# Patient Record
Sex: FEMALE | Race: WHITE
[De-identification: names, ages, dates, MRNs, and addresses within clinical notes are randomized per-mention and may not be internally consistent; named-entity substitution may affect disease eponyms.]

---

## 2017-03-27 ENCOUNTER — HOSPITAL ENCOUNTER (OUTPATIENT)
Dept: HOSPITAL 18 - NAVSJIPCSP | Age: 67
Discharge: HOME | End: 2017-03-27
Attending: INTERNAL MEDICINE
Payer: MEDICARE

## 2017-03-27 DIAGNOSIS — I11.9: ICD-10-CM

## 2017-03-27 DIAGNOSIS — J44.9: ICD-10-CM

## 2017-03-27 DIAGNOSIS — M48.06: ICD-10-CM

## 2017-03-27 DIAGNOSIS — E78.5: Primary | ICD-10-CM

## 2017-03-27 LAB
ANION GAP SERPL CALC-SCNC: 17 MMOL/L (ref 10–20)
BUN SERPL-MCNC: 7 MG/DL (ref 9.8–20.1)
CALCIUM SERPL-MCNC: 8.5 MG/DL (ref 7.8–10.44)
CHD RISK SERPL-RTO: 3.3 (ref ?–4.5)
CHLORIDE SERPL-SCNC: 100 MMOL/L (ref 98–107)
CHOLEST SERPL-MCNC: 174 MG/DL
CO2 SERPL-SCNC: 28 MMOL/L (ref 23–31)
CREAT CL PREDICTED SERPL C-G-VRATE: 0 ML/MIN (ref 70–130)
GLUCOSE SERPL-MCNC: 96 MG/DL (ref 80–115)
HDLC SERPL-MCNC: 52 MG/DL
LDLC SERPL CALC-MCNC: 78 MG/DL
POTASSIUM SERPL-SCNC: 4.6 MMOL/L (ref 3.5–5.1)
SODIUM SERPL-SCNC: 140 MMOL/L (ref 136–145)
TRIGL SERPL-MCNC: 218 MG/DL (ref ?–150)

## 2017-03-27 PROCEDURE — 80061 LIPID PANEL: CPT

## 2017-03-27 PROCEDURE — 80048 BASIC METABOLIC PNL TOTAL CA: CPT

## 2017-03-27 PROCEDURE — 36415 COLL VENOUS BLD VENIPUNCTURE: CPT

## 2017-07-07 ENCOUNTER — HOSPITAL ENCOUNTER (OUTPATIENT)
Dept: HOSPITAL 18 - NAV LAB | Age: 67
Discharge: HOME | End: 2017-07-07
Attending: INTERNAL MEDICINE
Payer: MEDICARE

## 2017-07-07 DIAGNOSIS — E78.5: Primary | ICD-10-CM

## 2017-07-07 LAB
ALBUMIN SERPL BCG-MCNC: 3.8 G/DL (ref 3.4–4.8)
ALP SERPL-CCNC: 96 U/L (ref 40–150)
ALT SERPL W P-5'-P-CCNC: 10 U/L (ref 8–55)
ANION GAP SERPL CALC-SCNC: 13 MMOL/L (ref 10–20)
ANISOCYTOSIS BLD QL SMEAR: (no result) (100X)
AST SERPL-CCNC: 18 U/L (ref 5–34)
BACTERIA UR QL AUTO: (no result) HPF
BASOPHILS # BLD AUTO: 0.2 THOU/UL (ref 0–0.2)
BASOPHILS NFR BLD AUTO: 1.9 % (ref 0–1)
BILIRUB SERPL-MCNC: 0.3 MG/DL (ref 0.2–1.2)
BUN SERPL-MCNC: 6 MG/DL (ref 9.8–20.1)
CALCIUM SERPL-MCNC: 8.7 MG/DL (ref 7.8–10.44)
CHD RISK SERPL-RTO: 3.1 (ref ?–4.5)
CHLORIDE SERPL-SCNC: 101 MMOL/L (ref 98–107)
CHOLEST SERPL-MCNC: 138 MG/DL
CO2 SERPL-SCNC: 31 MMOL/L (ref 23–31)
CREAT CL PREDICTED SERPL C-G-VRATE: 0 ML/MIN (ref 70–130)
EOSINOPHIL # BLD AUTO: 0.4 THOU/UL (ref 0–0.7)
EOSINOPHIL NFR BLD AUTO: 4.8 % (ref 0–10)
GLOBULIN SER CALC-MCNC: 2.6 G/DL (ref 2.4–3.5)
GLUCOSE SERPL-MCNC: 109 MG/DL (ref 80–115)
HDLC SERPL-MCNC: 45 MG/DL
HEP C INDEX: 0.07 S/CO (ref 0–0.79)
HGB BLD-MCNC: 15.5 G/DL (ref 12–16)
LDLC SERPL CALC-MCNC: 66 MG/DL
LYMPHOCYTES # BLD AUTO: 2.7 THOU/UL (ref 1.2–3.4)
LYMPHOCYTES NFR BLD AUTO: 34.2 % (ref 21–51)
MCH RBC QN AUTO: 25.9 PG (ref 27–31)
MCV RBC AUTO: 86.9 FL (ref 81–99)
MDIFF COMPLETE?: YES
MONOCYTES # BLD AUTO: 0.6 THOU/UL (ref 0.11–0.59)
MONOCYTES NFR BLD AUTO: 7.1 % (ref 0–10)
NEUTROPHILS # BLD AUTO: 4.2 THOU/UL (ref 1.4–6.5)
NEUTROPHILS NFR BLD AUTO: 52 % (ref 42–75)
PLATELET # BLD AUTO: 292 THOU/UL (ref 130–400)
PLATELET BLD QL SMEAR: (no result)
POTASSIUM SERPL-SCNC: 4.1 MMOL/L (ref 3.5–5.1)
RBC # BLD AUTO: 5.98 MILL/UL (ref 4.2–5.4)
RBC UR QL AUTO: (no result) HPF (ref 0–3)
SODIUM SERPL-SCNC: 141 MMOL/L (ref 136–145)
SP GR UR STRIP: 1.02 (ref 1–1.03)
T VAGINALIS URNS QL MICRO: (no result) HPF
TRIGL SERPL-MCNC: 136 MG/DL (ref ?–150)
URNS CMNT MICRO: NO
WBC # BLD AUTO: 8 THOU/UL (ref 4.8–10.8)

## 2017-07-07 PROCEDURE — 86803 HEPATITIS C AB TEST: CPT

## 2017-07-07 PROCEDURE — 85025 COMPLETE CBC W/AUTO DIFF WBC: CPT

## 2017-07-07 PROCEDURE — 80053 COMPREHEN METABOLIC PANEL: CPT

## 2017-07-07 PROCEDURE — 81003 URINALYSIS AUTO W/O SCOPE: CPT

## 2017-07-07 PROCEDURE — 36415 COLL VENOUS BLD VENIPUNCTURE: CPT

## 2017-07-07 PROCEDURE — 80061 LIPID PANEL: CPT

## 2017-07-07 PROCEDURE — 81015 MICROSCOPIC EXAM OF URINE: CPT

## 2018-05-01 ENCOUNTER — HOSPITAL ENCOUNTER (OUTPATIENT)
Dept: HOSPITAL 18 - NAV RAD | Age: 68
Discharge: HOME | End: 2018-05-01
Attending: INTERNAL MEDICINE
Payer: MEDICARE

## 2018-05-01 DIAGNOSIS — M79.672: Primary | ICD-10-CM

## 2018-05-01 DIAGNOSIS — M77.32: ICD-10-CM

## 2018-05-01 NOTE — RAD
LEFT HEEL TWO VIEWS:

 

History: Heel pain. 

 

Comparison: 8-13-14

 

FINDINGS: 

There is a calcaneal spur at the insertion of plantar fascia on the calcaneus. Some sclerotic change 
in the more posterior aspect of the calcaneus. This is a stable and benign appearing finding. Subtala
r joint is unremarkable. There are some arthritic changes of the talonavicular joint. 

 

IMPRESSION: 

Calcaneal spur. No acute process. 

 

POS: [facility]

## 2020-09-10 ENCOUNTER — HOSPITAL ENCOUNTER (INPATIENT)
Dept: HOSPITAL 92 - ERS | Age: 70
LOS: 5 days | Discharge: HOME | DRG: 273 | End: 2020-09-15
Attending: INTERNAL MEDICINE | Admitting: INTERNAL MEDICINE
Payer: MEDICARE

## 2020-09-10 VITALS — BODY MASS INDEX: 33.2 KG/M2

## 2020-09-10 DIAGNOSIS — J44.1: ICD-10-CM

## 2020-09-10 DIAGNOSIS — E66.2: ICD-10-CM

## 2020-09-10 DIAGNOSIS — J96.21: ICD-10-CM

## 2020-09-10 DIAGNOSIS — I50.33: ICD-10-CM

## 2020-09-10 DIAGNOSIS — Z98.41: ICD-10-CM

## 2020-09-10 DIAGNOSIS — I48.92: Primary | ICD-10-CM

## 2020-09-10 DIAGNOSIS — E78.5: ICD-10-CM

## 2020-09-10 DIAGNOSIS — G92: ICD-10-CM

## 2020-09-10 DIAGNOSIS — Z79.899: ICD-10-CM

## 2020-09-10 DIAGNOSIS — I48.91: ICD-10-CM

## 2020-09-10 DIAGNOSIS — F17.200: ICD-10-CM

## 2020-09-10 DIAGNOSIS — E87.5: ICD-10-CM

## 2020-09-10 DIAGNOSIS — I08.1: ICD-10-CM

## 2020-09-10 DIAGNOSIS — Z79.82: ICD-10-CM

## 2020-09-10 DIAGNOSIS — Z20.828: ICD-10-CM

## 2020-09-10 DIAGNOSIS — Z88.5: ICD-10-CM

## 2020-09-10 DIAGNOSIS — J96.22: ICD-10-CM

## 2020-09-10 DIAGNOSIS — Z88.8: ICD-10-CM

## 2020-09-10 DIAGNOSIS — Z88.0: ICD-10-CM

## 2020-09-10 DIAGNOSIS — E87.1: ICD-10-CM

## 2020-09-10 DIAGNOSIS — I47.1: ICD-10-CM

## 2020-09-10 DIAGNOSIS — J40: ICD-10-CM

## 2020-09-10 LAB
ALBUMIN SERPL BCG-MCNC: 3.7 G/DL (ref 3.4–4.8)
ALP SERPL-CCNC: 93 U/L (ref 40–110)
ALT SERPL W P-5'-P-CCNC: 21 U/L (ref 8–55)
ANALYZER IN CARDIO: (no result)
ANION GAP SERPL CALC-SCNC: 12 MMOL/L (ref 10–20)
ANION GAP SERPL CALC-SCNC: 13 MMOL/L (ref 10–20)
AST SERPL-CCNC: 22 U/L (ref 5–34)
BASE EXCESS STD BLDA CALC-SCNC: 6.5 MEQ/L
BASOPHILS # BLD AUTO: 0.1 THOU/UL (ref 0–0.2)
BASOPHILS NFR BLD AUTO: 1.3 % (ref 0–1)
BILIRUB SERPL-MCNC: 0.6 MG/DL (ref 0.2–1.2)
BUN SERPL-MCNC: 13 MG/DL (ref 9.8–20.1)
BUN SERPL-MCNC: 14 MG/DL (ref 9.8–20.1)
CA-I BLDA-SCNC: 1.16 MMOL/L (ref 1.12–1.3)
CALCIUM SERPL-MCNC: 7.7 MG/DL (ref 7.8–10.44)
CALCIUM SERPL-MCNC: 8.5 MG/DL (ref 7.8–10.44)
CHLORIDE SERPL-SCNC: 87 MMOL/L (ref 98–107)
CHLORIDE SERPL-SCNC: 89 MMOL/L (ref 98–107)
CK MB SERPL-MCNC: 4.2 NG/ML (ref 0–6.6)
CK SERPL-CCNC: 94 U/L (ref 29–168)
CO2 SERPL-SCNC: 29 MMOL/L (ref 23–31)
CO2 SERPL-SCNC: 33 MMOL/L (ref 23–31)
CREAT CL PREDICTED SERPL C-G-VRATE: 0 ML/MIN (ref 70–130)
CREAT CL PREDICTED SERPL C-G-VRATE: 123 ML/MIN (ref 70–130)
EOSINOPHIL # BLD AUTO: 0.1 THOU/UL (ref 0–0.7)
EOSINOPHIL NFR BLD AUTO: 0.7 % (ref 0–10)
GLOBULIN SER CALC-MCNC: 2.8 G/DL (ref 2.4–3.5)
GLUCOSE SERPL-MCNC: 108 MG/DL (ref 80–115)
GLUCOSE SERPL-MCNC: 127 MG/DL (ref 80–115)
HCO3 BLDA-SCNC: 41 MEQ/L (ref 22–28)
HCT VFR BLDA CALC: 45 % (ref 36–47)
HGB BLD-MCNC: 14.9 G/DL (ref 12–16)
HGB BLDA-MCNC: 15.4 G/DL (ref 12–16)
LIPASE SERPL-CCNC: 23 U/L (ref 8–78)
LYMPHOCYTES # BLD: 1.8 THOU/UL (ref 1.2–3.4)
LYMPHOCYTES NFR BLD AUTO: 24 % (ref 21–51)
MCH RBC QN AUTO: 25.1 PG (ref 27–31)
MCV RBC AUTO: 83.6 FL (ref 78–98)
MDIFF COMPLETE?: YES
MONOCYTES # BLD AUTO: 0.6 THOU/UL (ref 0.11–0.59)
MONOCYTES NFR BLD AUTO: 8.2 % (ref 0–10)
NEUTROPHILS # BLD AUTO: 5 THOU/UL (ref 1.4–6.5)
NEUTROPHILS NFR BLD AUTO: 65.8 % (ref 42–75)
O2 A-A PPRESDIFF RESPIRATORY: 498.18 MM[HG] (ref 0–20)
PCO2 BLDA: 124.5 MMHG (ref 35–45)
PH BLDA: 7.14 [PH] (ref 7.35–7.45)
PLATELET # BLD AUTO: 173 THOU/UL (ref 130–400)
PO2 BLDA: 59.2 MMHG (ref 80–?)
POLYCHROMASIA BLD QL SMEAR: (no result) (100X)
POTASSIUM BLD-SCNC: 4.62 MMOL/L (ref 3.7–5.3)
POTASSIUM SERPL-SCNC: 5.1 MMOL/L (ref 3.5–5.1)
POTASSIUM SERPL-SCNC: 5.5 MMOL/L (ref 3.5–5.1)
RBC # BLD AUTO: 5.94 MILL/UL (ref 4.2–5.4)
SODIUM SERPL-SCNC: 125 MMOL/L (ref 136–145)
SODIUM SERPL-SCNC: 127 MMOL/L (ref 136–145)
SPECIMEN DRAWN FROM PATIENT: (no result)
STOMATOCYTES BLD QL SMEAR: (no result) (100X)
TARGETS BLD QL SMEAR: (no result) (100X)
TROPONIN I SERPL DL<=0.01 NG/ML-MCNC: 0.03 NG/ML (ref ?–0.03)
WBC # BLD AUTO: 7.6 THOU/UL (ref 4.8–10.8)

## 2020-09-10 PROCEDURE — 76942 ECHO GUIDE FOR BIOPSY: CPT

## 2020-09-10 PROCEDURE — 5A09357 ASSISTANCE WITH RESPIRATORY VENTILATION, LESS THAN 24 CONSECUTIVE HOURS, CONTINUOUS POSITIVE AIRWAY PRESSURE: ICD-10-PCS | Performed by: INTERNAL MEDICINE

## 2020-09-10 PROCEDURE — 85025 COMPLETE CBC W/AUTO DIFF WBC: CPT

## 2020-09-10 PROCEDURE — 93621 COMP EP EVL L PAC&REC C SINS: CPT

## 2020-09-10 PROCEDURE — 99152 MOD SED SAME PHYS/QHP 5/>YRS: CPT

## 2020-09-10 PROCEDURE — 83690 ASSAY OF LIPASE: CPT

## 2020-09-10 PROCEDURE — 94660 CPAP INITIATION&MGMT: CPT

## 2020-09-10 PROCEDURE — 96367 TX/PROPH/DG ADDL SEQ IV INF: CPT

## 2020-09-10 PROCEDURE — 80053 COMPREHEN METABOLIC PANEL: CPT

## 2020-09-10 PROCEDURE — 71045 X-RAY EXAM CHEST 1 VIEW: CPT

## 2020-09-10 PROCEDURE — 36415 COLL VENOUS BLD VENIPUNCTURE: CPT

## 2020-09-10 PROCEDURE — C1732 CATH, EP, DIAG/ABL, 3D/VECT: HCPCS

## 2020-09-10 PROCEDURE — 93005 ELECTROCARDIOGRAM TRACING: CPT

## 2020-09-10 PROCEDURE — 96365 THER/PROPH/DIAG IV INF INIT: CPT

## 2020-09-10 PROCEDURE — 83880 ASSAY OF NATRIURETIC PEPTIDE: CPT

## 2020-09-10 PROCEDURE — 84443 ASSAY THYROID STIM HORMONE: CPT

## 2020-09-10 PROCEDURE — 87040 BLOOD CULTURE FOR BACTERIA: CPT

## 2020-09-10 PROCEDURE — 82805 BLOOD GASES W/O2 SATURATION: CPT

## 2020-09-10 PROCEDURE — 83930 ASSAY OF BLOOD OSMOLALITY: CPT

## 2020-09-10 PROCEDURE — 93653 COMPRE EP EVAL TX SVT: CPT

## 2020-09-10 PROCEDURE — 83605 ASSAY OF LACTIC ACID: CPT

## 2020-09-10 PROCEDURE — 36416 COLLJ CAPILLARY BLOOD SPEC: CPT

## 2020-09-10 PROCEDURE — 83735 ASSAY OF MAGNESIUM: CPT

## 2020-09-10 PROCEDURE — 96375 TX/PRO/DX INJ NEW DRUG ADDON: CPT

## 2020-09-10 PROCEDURE — 82550 ASSAY OF CK (CPK): CPT

## 2020-09-10 PROCEDURE — 84484 ASSAY OF TROPONIN QUANT: CPT

## 2020-09-10 PROCEDURE — 93306 TTE W/DOPPLER COMPLETE: CPT

## 2020-09-10 PROCEDURE — 96366 THER/PROPH/DIAG IV INF ADDON: CPT

## 2020-09-10 PROCEDURE — 83935 ASSAY OF URINE OSMOLALITY: CPT

## 2020-09-10 PROCEDURE — 94640 AIRWAY INHALATION TREATMENT: CPT

## 2020-09-10 PROCEDURE — 5A2204Z RESTORATION OF CARDIAC RHYTHM, SINGLE: ICD-10-PCS | Performed by: INTERNAL MEDICINE

## 2020-09-10 PROCEDURE — 71275 CT ANGIOGRAPHY CHEST: CPT

## 2020-09-10 PROCEDURE — 93623 PRGRMD STIMJ&PACG IV RX NFS: CPT

## 2020-09-10 PROCEDURE — 82553 CREATINE MB FRACTION: CPT

## 2020-09-10 PROCEDURE — 87149 DNA/RNA DIRECT PROBE: CPT

## 2020-09-10 PROCEDURE — 97139 UNLISTED THERAPEUTIC PX: CPT

## 2020-09-10 PROCEDURE — C1730 CATH, EP, 19 OR FEW ELECT: HCPCS

## 2020-09-10 PROCEDURE — 96372 THER/PROPH/DIAG INJ SC/IM: CPT

## 2020-09-10 PROCEDURE — 80048 BASIC METABOLIC PNL TOTAL CA: CPT

## 2020-09-10 PROCEDURE — 93010 ELECTROCARDIOGRAM REPORT: CPT

## 2020-09-10 PROCEDURE — U0002 COVID-19 LAB TEST NON-CDC: HCPCS

## 2020-09-10 PROCEDURE — 84300 ASSAY OF URINE SODIUM: CPT

## 2020-09-10 PROCEDURE — 93613 INTRACARDIAC EPHYS 3D MAPG: CPT

## 2020-09-10 NOTE — PDOC.HHP
Hospitalist HPI





- History of Present Illness


Chest pain


History of Present Illness: 


Patient is a 70 year old female with PMH diastolic CHF, COPD, asthma who 

presents to ED for chest pain. Patient developed chest pain at home, assocaited 

with SOB, orthopnea, dyspnea on exertion. EMS brought here, on route noted to 

be in atrial flutter w/ RVR, started on cardizem drip 5/hr. Here in ED, rhythm 

noted on arrival to be 170s atrial flutter, patient with low BP and unstable so 

given DCCV and converted to sinus rhythm and then atrial fibrillation w/ rate 70

-80s and BP improved. Patient was hypoxic on NC (patient wears 4L o2 by NC at 

home),  required NRB then BIPAP to maintain sats above 89%. CXR w R pleural 

effusion and overall CXR reported as suspicious for CHF. Last echo in system 6/ 2019, revealed preserved EF and mild/moderate mitral and tricuspid 

regurgitation. Patient reported to ED no known history of afib or aflutter. In 

ED, ABG significant for repsiratory acidosis w/ CO2 124, pH 7.14. Na 127, K 

5.5. COVID screen negative. Pateint to be admitted to IMCU for rescue BIPAP for 

hypoxic/hypercapneic respiratory failure





Hospitalist ROS





- Review of Systems


ROS unobtainable: due to mental status (BIPAP, altered mental status, awake and 

alert but not able to help with ROS significantly since desaturates rapidly if 

bipap removed to talk)


All other systems reviewed; all pertinent +/- noted in HPI/Subj





- Medication


Medications: 





reviewed, see ED and admission documents for list





Hospitalist History





- Past Medical History


Other Medical History: 





diastolic CHF, COPD, asthma





- Past Surgical History


Other Surgical History: 





knee arthroscopy


spinal surgery/laminectomy


tubal ligation





- Family History


Family History: reports: Other


Other Family History: 





sisters x 3 passed from aneurysms.





- Social History


Tobacco Type: cigarettes


Alcohol: reports: Occassional


Drugs: reports: none





- Exam


General Appearance: NAD, awake alert


General - other findings: on BIPAP, awake and alert


Eye: PERRL, anicteric sclera


ENT: normocephalic atraumatic, no oropharyngeal lesions, moist mucosa


Neck: supple, symmetric, no JVD, no thyromegaly, no lymphadenopathy, no carotid 

bruit


Heart: RRR, no murmur, no gallops, no rubs, normal peripheral pulses


Respiratory: CTAB, no rales, no ronchi, normal chest expansion, no tachypnea, 

normal percussion


Respiratory - other findings: crackles bibasilar


Gastrointestinal: soft, non-tender, non-distended, normal bowel sounds, no 

palpable masses, no hepatomegaly, no splenomegaly, no bruit


Extremities: no cyanosis, no clubbing, no edema


Skin: normal turgor, no lesions, no rashes


Neurological: cranial nerve grossly intact, normal sensation to touch, no 

weakness, no focal deficits, no new deficit


Neurological - other findings: exam limited by bipap and immediate desaturation 

on removal of mask


Musculoskeletal: normal tone, normal strength, no muscle wasting


Psychiatric - other findings: unable to evaluate





Hospitalist Results





- Labs


Result Diagrams: 


 09/10/20 18:30





 09/10/20 18:30


Lab results: 


 











WBC  7.6 thou/uL (4.8-10.8)   09/10/20  18:30    


 


Hgb  14.9 g/dL (12.0-16.0)   09/10/20  18:30    


 


Hct  49.7 % (36.0-47.0)  H  09/10/20  18:30    


 


MCV  83.6 fL (78.0-98.0)   09/10/20  18:30    


 


Plt Count  173 thou/uL (130-400)   09/10/20  18:30    


 


Neutrophils %  65.8 % (42.0-75.0)   09/10/20  18:30    


 


ABG pH  7.14  (7.35-7.45)  L*  09/10/20  20:17    


 


ABG pCO2  124.5 mmHg (35.0-45.0)  H*  09/10/20  20:17    


 


ABG pO2  59.2 mmHg (> 80.0)  L*  09/10/20  20:17    


 


Sodium  127 mmol/L (136-145)  L  09/10/20  18:30    


 


Potassium  5.5 mmol/L (3.5-5.1)  H  09/10/20  18:30    


 


Chloride  87 mmol/L ()  L  09/10/20  18:30    


 


Carbon Dioxide  33 mmol/L (23-31)  H  09/10/20  18:30    


 


BUN  14 mg/dL (9.8-20.1)   09/10/20  18:30    


 


Creatinine  0.64 mg/dL (0.6-1.1)   09/10/20  18:30    


 


Glucose  108 mg/dL ()   09/10/20  18:30    


 


Lactic Acid  0.8 mmol/L (0.5-2.2)   09/10/20  19:34    


 


Calcium  8.5 mg/dL (7.8-10.44)   09/10/20  18:30    


 


Total Bilirubin  0.6 mg/dL (0.2-1.2)   09/10/20  18:30    


 


AST  22 U/L (5-34)   09/10/20  18:30    


 


ALT  21 U/L (8-55)   09/10/20  18:30    


 


Alkaline Phosphatase  93 U/L ()   09/10/20  18:30    


 


Creatine Kinase  94 U/L ()   09/10/20  18:30    


 


CK-MB (CK-2)  4.2 ng/mL (0-6.6)   09/10/20  18:30    


 


Troponin I  0.029 ng/mL (< 0.028)  H  09/10/20  21:07    


 


B-Natriuretic Peptide  244.1 pg/mL (0-100)  H  09/10/20  18:30    


 


Serum Total Protein  6.5 g/dL (6.0-8.3)   09/10/20  18:30    


 


Albumin  3.7 g/dL (3.4-4.8)   09/10/20  18:30    


 


Lipase  23 U/L (8-78)   09/10/20  18:30    











Additional comment: 





BP: 137/78, MAP: 97, Pulse: 90, Resp: 19, Pain: 0, O2 sat: 91 on (Non Rebreather

), Time: 9/10/2020 20:17. 


VITAL SIGNS Thu Sep 10, 2020 20:23 BRONSON Serrato Sarah 


BP: 116/62, MAP: 80, Pulse: 86, Resp: 20, Pain: 0, O2 sat: 92 on (Non Rebreather

), Time: 9/10/2020 20:23. 


VITAL SIGNS Thu Sep 10, 2020 21:00 BRONSON Serrato Sarah 


BP: 136/76, MAP: 96, Pulse: 90, Resp: 20, Pain: 0, O2 sat: 89 on (Bipap), Time: 

9/10/2020 21:00. 


VITAL SIGNS Thu Sep 10, 2020 22:01 BRONSON Momin Gerber 


BP: 114/69, MAP: 84, Pulse: 84, Resp: 15, O2 sat: 92 on (Bipap), Time: 9/10/

2020 22:01.





all imaging, lab reports, ED documents, EKGs from this admission reviewed.





Hospitalist H&P A/P





- Plan


Plan: 


Patient is a 70 year old female with PMH diastolic CHF, COPD, asthma who 

presents to ED for chest pain. 





# atrial fibrillation/flutter


# acute and chronic diastolic CHF


# elevated troponin


# acute hypoxic and hypercapneic respiratory failure


# abnormal chest imaging - atalectasis, LLL pneumonia, mediastinal/hilar 

lymphadenopathy on CTA chest


# COPD and asthma w/ suspected exacerbation


EMS called for chest pain, found patient in atrial flutter w/ RVR, started on 

cardizem drip however rate went to 170s and BP unstable, DCCV performed and 

patient now in afib rate controlled, patient is chronically hypoxic on 4L by NC 

at home, however here she was desaturating on NC and NRB, improved finally with 

BIPAP. CXR w R pleural effusion suspicious for CHF. Last echo in system 6/2019, 

revealed preserved EF and mild/moderate mitral and tricuspid regurgitation. 


- admit to IMCU, continue BIPAP, recheck lactic/BMP/ABG now,


- consult cardiology, pulmonary


- IV steroids/levaquin ordered, scheduled/PRN nebs, continue home dulera


- CTA chest concerning for several things, including possible bronchial mass 

and LLL pneumonia, R pleural effusion.


- patient required DCCV in ED, continue lovenox DVT dosing


- lasix IV, ASA


- echo





# hyponatremia 


# hyperkalemia


- bladder scan to help rule out obstruction


- trend BMP


- diuresis as above





DVT/GI ppx


62 minutes critical care time

## 2020-09-10 NOTE — RAD
CHEST ONE VIEW:

9/10/20

 

INDICATION:

History of shortness of breath. 

 

COMPARISON: 

Prior exam dated 5/30/19. 

 

FINDINGS: 

There is cardiomegaly with pulmonary vascular congestion. There is a small sized right and tiny left 
pleural effusions. There is air space opacity in the right midline and right lower lobe. No pneumotho
rax  is evident. Pacer pads overlie the chest wall. 

 

IMPRESSION: 

Findings suspicious for mild to moderate CHF. 

 

POS: [facility]

## 2020-09-10 NOTE — CT
CT ANGIOGRAM THORAX WITH CONTRAST:

(CTA pulmonary angiogram)



DATE:

9/10/2020



HISTORY:

70-year-old female with chest pain and dyspnea



TECHNIQUE:

IV injection of iodinated contrast.

Scan acquisition timing attempted to coincide with iodinated contrast bolus reaching maximal density 
in pulmonary arteries.

3-D MIP reconstructions.



FINDINGS:

No pulmonary thromboembolism.

Atherosclerotic calcification, without aneurysm, dissection, or rupture, of thoracic aorta.

Occlusion of right lower lobe bronchus.

Severe narrowing or occlusion of right bronchus intermedius and right middle lobe bronchus.

Consolidation of majority of volume of right lower lobe, probably severe or atelectasis.

Significant atelectasis of right middle lobe.

Large consolidation involving superior and basilar portions of left lower lobe broadly abutting poste
rior pleural surface.

Groundglass haziness in the portions of the left upper lobe anterior to this. The left lower lobe con
solidation is contiguous with posterior aspect of left hilum.

Small to moderate size right pleural effusion.

Tiny left pleural effusion.

Pericardial effusion.

Cardiomegaly with four-chamber dilation.

No pneumothorax.

Fluid in superior pericardial recess.

Noncalcified moderately enlarged multiple mediastinal lymph nodes, including subcarinal. Bilateral hi
lar lymphadenopathy.

Centrilobular emphysematous changes in the aerated portions of the lungs.



IMPRESSION:

1) right lower lobe bronchial occlusion. This raises the possibility of malignant bronchial lesion.

2) Right bronchus intermedius and right middle lobe bronchial occlusion or severe stenosis.

3) severe atelectasis of right lower lobe and moderate atelectasis of right middle lobe.

4) moderately large consolidation in left lower lobe broadly abutting posterior pleural surface.

5) right pleural effusion.

6) cardiomegaly.

7) pericardial effusion.

8) no pulmonary thromboembolism.

9) nonspecific mild to moderate mediastinal and hilar lymphadenopathy.



Reported By: Jace Mustafa 

Electronically Signed:  9/10/2020 11:04 PM

## 2020-09-11 LAB
ANION GAP SERPL CALC-SCNC: 13 MMOL/L (ref 10–20)
ANION GAP SERPL CALC-SCNC: 13 MMOL/L (ref 10–20)
BASOPHILS # BLD AUTO: 0.1 THOU/UL (ref 0–0.2)
BASOPHILS NFR BLD AUTO: 1.1 % (ref 0–1)
BUN SERPL-MCNC: 14 MG/DL (ref 9.8–20.1)
BUN SERPL-MCNC: 15 MG/DL (ref 9.8–20.1)
CALCIUM SERPL-MCNC: 7.9 MG/DL (ref 7.8–10.44)
CALCIUM SERPL-MCNC: 8.2 MG/DL (ref 7.8–10.44)
CHLORIDE SERPL-SCNC: 88 MMOL/L (ref 98–107)
CHLORIDE SERPL-SCNC: 88 MMOL/L (ref 98–107)
CO2 SERPL-SCNC: 34 MMOL/L (ref 23–31)
CO2 SERPL-SCNC: 34 MMOL/L (ref 23–31)
CREAT CL PREDICTED SERPL C-G-VRATE: 108 ML/MIN (ref 70–130)
CREAT CL PREDICTED SERPL C-G-VRATE: 119 ML/MIN (ref 70–130)
EOSINOPHIL # BLD AUTO: 0 THOU/UL (ref 0–0.7)
EOSINOPHIL NFR BLD AUTO: 0.1 % (ref 0–10)
GLUCOSE SERPL-MCNC: 103 MG/DL (ref 80–115)
GLUCOSE SERPL-MCNC: 115 MG/DL (ref 80–115)
HGB BLD-MCNC: 14.2 G/DL (ref 12–16)
LYMPHOCYTES # BLD: 0.6 THOU/UL (ref 1.2–3.4)
LYMPHOCYTES NFR BLD AUTO: 8 % (ref 21–51)
MAGNESIUM SERPL-MCNC: 2 MG/DL (ref 1.6–2.6)
MCH RBC QN AUTO: 25.5 PG (ref 27–31)
MCV RBC AUTO: 85.1 FL (ref 78–98)
MONOCYTES # BLD AUTO: 0.1 THOU/UL (ref 0.11–0.59)
MONOCYTES NFR BLD AUTO: 0.7 % (ref 0–10)
NEUTROPHILS # BLD AUTO: 6.3 THOU/UL (ref 1.4–6.5)
NEUTROPHILS NFR BLD AUTO: 90.1 % (ref 42–75)
PLATELET # BLD AUTO: 171 THOU/UL (ref 130–400)
POTASSIUM SERPL-SCNC: 5.7 MMOL/L (ref 3.5–5.1)
POTASSIUM SERPL-SCNC: 5.9 MMOL/L (ref 3.5–5.1)
RBC # BLD AUTO: 5.58 MILL/UL (ref 4.2–5.4)
SODIUM SERPL-SCNC: 129 MMOL/L (ref 136–145)
SODIUM SERPL-SCNC: 129 MMOL/L (ref 136–145)
TROPONIN I SERPL DL<=0.01 NG/ML-MCNC: 0.03 NG/ML (ref ?–0.03)
TROPONIN I SERPL DL<=0.01 NG/ML-MCNC: 0.04 NG/ML (ref ?–0.03)
WBC # BLD AUTO: 7 THOU/UL (ref 4.8–10.8)

## 2020-09-11 RX ADMIN — MOMETASONE FUROATE AND FORMOTEROL FUMARATE DIHYDRATE SCH PUFF: 200; 5 AEROSOL RESPIRATORY (INHALATION) at 07:22

## 2020-09-11 RX ADMIN — Medication SCH ML: at 20:32

## 2020-09-11 RX ADMIN — ASPIRIN SCH: 81 TABLET ORAL at 08:38

## 2020-09-11 RX ADMIN — MOMETASONE FUROATE AND FORMOTEROL FUMARATE DIHYDRATE SCH PUFF: 200; 5 AEROSOL RESPIRATORY (INHALATION) at 18:39

## 2020-09-11 NOTE — CON
DATE OF CONSULTATION:  09/11/2020



A 75 minutes of time, of that time, greater than 50% spent with the patient and/or

the patient's unit in the hospital. 



REASON FOR CONSULTATION:  COPD exacerbation.



HISTORY OF PRESENT ILLNESS:  The patient is a 70-year-old female, who presents to

the hospital with shortness of breath, chest congestion, and apparent atrial

flutter.  She lives in [place] and sees Dr. Medrano for her care.  She has seen Dr. Mcclain in the hospital in the past, but does not see him in the office.  She was put

on BiPAP last night after presenting with acute hypercapnic respiratory failure. 



PAST MEDICAL HISTORY:  

1. COPD - severe.

2. Continue tobacco abuse - almost two packs per day.

3. Diastolic heart failure.

4. Morbid obesity.

5. Hyperlipidemia.



PAST SURGICAL HISTORY:  

1. Knee arthroscopy.

2. Spinal surgery.

3. Laminectomy.

4. Tubal ligation.



FAMILY MEDICAL HISTORY:  Unremarkable except for cerebral aneurysm.



SOCIAL HISTORY:  She has smoked up to three packs per day during most of her adult

life, but has cut back to about 2 packs per day.  She drinks at least four large

cans of beer per day.  Does not use any illicit drugs. 



REVIEW OF SYSTEMS:  No fever or chills, but has had chest congestion and copious

sputum production.  No nausea, vomiting, hematemesis, melena, hematochezia,

hematuria, or dysuria. 



PHYSICAL EXAMINATION:

VITAL SIGNS:  Temperature 98.6, pulse 86, blood pressure 117/67, and O2 saturation

93%. 

GENERAL:  She is a morbidly obese female, with BMI 33.2 and height 5 feet 5 inches. 

HEENT:  She has periorbital edema beneath her eyes probably from BiPAP mask.  She

has poor dentition. 

NECK:  No adenopathy or JVD. 

LUNGS:  Distant, but clear breath sounds. 

CARDIAC:  S1 and S2.  Regular. 

ABDOMEN:  Obese, soft, and nontender. 

EXTREMITIES:  1+ edema from the knees downward.



LABORATORY DATA:  Sodium 129, potassium 5.9, chloride 88, CO2 of 34, BUN 15,

creatinine 0.7, and glucose 103.  A pH of 7.14, pCO2 of 124, and pO2 of 59, that was

taken last night at 2017 hours.  White blood cell count 7, hematocrit 47.5, and

platelet count 171.  COVID test was negative.  A chest x-ray demonstrates

cardiomegaly with slight blunting of the right costophrenic angle.  It is hard to

see the bottom of the left lung.  CT of the chest demonstrates right lower lobe

bronchus occlusion, which may be mucus versus endobronchial tumor.  She has

atelectasis of the right lower lobe with moderate consolidation left lower lobe.

Small pericardial effusion. 



ASSESSMENT:  

1. Chronic obstructive pulmonary disease exacerbation.

2. Mucus plugging versus endobronchial tumor.

3. Atrial flutter.

4. Possible obesity hypoventilation syndrome.



PLAN:  

1. Continue treatment with nebulization treatments, steroids, and antibiotics and we

will continue BiPAP. 

2. If worsens, consider endotracheal intubation.

3. At some point in the future, she will probably need bronchoscopy, but that is not

a pressing need. 

4. At some point, she probably will need a cardiac ablation, although now it appears

she is back in a sinus rhythm. 



Thank you for the referral.  We will follow with you.  I will notify Dr. Mcclain of the

patient's hospitalization. 







Job ID:  [id number]

## 2020-09-11 NOTE — CON
DATE OF CONSULTATION:  09/11/2020



HISTORY:  I am seeing Mrs. Brian at our [facility]

ICU/IMCU for Electrophysiology consultation for the following problems: 

1. Newly found atrial flutter with rapid ventricular rates requiring emergent

cardioversion on admission, 09/10/2020. 

2. Acute on chronic obstructive pulmonary disease exacerbation requiring BiPAP 
with

altered mental status. 

3. diastolic heart failure.

4. Preserved LVEF by 2D echo from 06/01/2019.  Mild-to-moderate MR, mild-to-
moderate

TR, and elevated pulmonary pressures of approximately 52. 



ALLERGIES:  BEE VENOM PROTEIN, CODEINE, PENICILLINS, AND TRAMADOL.



MEDICATIONS AT HOME:  Included Zocor, diclofenac, K-Dur, aspirin, Kenalog,

pregabalin, furosemide, DuoNeb, Dulera, NicoDerm CQ. 



SUBJECTIVE:  Mrs. Brian is on BiPAP, still gives me full history, most of the

history obtained from the chart and the nurses.  This lady presented with 
worsening

dyspnea.  Denies palpitations.  Did not pass out.  No stroke-like symptoms.  
Denies

angina-like discomfort.  No fevers or chills.  Chronic cough is noted.  She is 
using

inhalers at home.  In the ER, she was noted to be in atrial flutter and due to

marked respiratory distress, trying to receive emergent cardioversion.  She 
remained

in sinus rhythm subsequently.  Chest x-ray was suspicious for CHF or pneumonia 
and

chronic COPD.  She was still markedly hypoxic and hypercapnic.  She also noted 
to

have hyponatremia and hyperkalemia; all being addressed now. 



REVIEW OF SYSTEMS:  Rest of 12-point system otherwise unremarkable.



PAST MEDICAL HISTORY:  As above.



SOCIAL HISTORY:  The patient is a smoker.  Denies EtOH or drug abuse.



FAMILY HISTORY:  Noncontributory.



OBJECTIVE DATA:  VITAL SIGNS:  Blood pressure is 104/51, heart rate 68, 
temperature

is 98.9, respiratory rate is 12. 

GENERAL:  Reveals an alert and oriented woman, in no apparent distress and on 
BiPAP.

 Some cyanosis is noted. 

NECK:  Supple.  Jugular veins difficult to visualize, somewhat elevated. 

CHEST:  Coarse.  No wheezing is heard.  No brenda crackles appreciated. 

HEART:  Sounds are regular rate and rhythm.  1/6 holosystolic murmur heard at 
the

left lower precordial area.  PMI is nonpalpable. 

ABDOMEN:  Benign.  Bowel sounds positive. 

EXTREMITIES:  Lower extremity with 1+ lower extremity edema. 

NEUROLOGIC:  The patient is nonfocal. 

MUSCULOSKELETAL:  Without joint swelling, deformities. 

SKIN:  Without rash.



DATABASE:  EKG is reviewed.  Initial EKG reveals a typical-appearing atrial 
flutter

with ventricular rates of 142 beats per minute.  Subsequent EKGs reveal sinus

rhythm, short PAT run only.  White count is 7.0, hemoglobin 14.2, platelet 
count is

171.  Sodium 129, potassium 5.9, BUN is 15, and creatinine 0.71.  Blood gas from

09/10 with a pH of 7.14, pCO2 of 124, PO2 of 59.2.  COVID serology is negative. 



ASSESSMENT/PLAN:  Mrs. Brian is a pleasant 70-year-old woman with history of

advanced chronic obstructive pulmonary disease, chronic smoking, who is 
presenting

with marked hypoxia, respiratory distress.  BNP slightly elevated, markedly 
elevated

CO2 is noted suspicious of acute on chronic COPD exacerbation, possible fluid

overload cannot be ruled out.  She has history of preserved LV function in the 
past.

 Also EKG was showing typical atrial flutter, which required emergent 
cardioversion

due to her acute respiratory distress.  Symptoms stabilized with treatment as 
above.

 She is still being evaluated and optimized from a pulmonary status. 



I discussed the etiology of her atrial flutter, the treatment options of rate

control versus antiarrhythmic agents or ablation was discussed. 



At this point, I would recommend continued anticoagulation.  Consider 
radiofrequency

ablation in this severely diseased lady.  Hence, relatively simple cavotricuspid

isthmus could eliminate further flutter episodes.  It likely will be difficult 
to

control in case of recurrences.  Naturally, she is higher than usual anesthesia

risk.  Overall, still feel it is reasonable to proceed once she is stabilized 
from a

Pulmonary standpoint as inpatient or outpatient. 



On the other hand, she is likely a poor candidate for left atrial ablation

procedures. 



COPD exacerbation as per primary team.  Further pulmonary evaluation in 
progress. 



Smoking cessation strongly advised.







Job ID:  [id number]



[facility]

## 2020-09-11 NOTE — CON
DATE OF CONSULTATION:  09/11/2020



REASON FOR CONSULTATION:  Atrial flutter.



HISTORY OF PRESENT ILLNESS:  Ms. Brian is a 70-year-old woman with history of COPD

and sleep apnea, came to the hospital last night with shortness of breath and chest

pain, found to be in atrial flutter with a rapid ventricular response.  She

tolerated it poorly and was cardioverted.  She has been in sinus rhythm with PACs

since then. 



Now, she is in sinus rhythm.  No previous cardiac history.  Positive history of

COPD.  Positive history of sleep apnea. 



MEDICATIONS:  

1. Aspirin.

2. Lyrica.

3. Lasix.



ALLERGIES:  PENICILLIN, CODEINE, AND TRAMADOL.



REVIEW OF SYSTEMS:  CONSTITUTIONAL:  No significant weight gain or loss. 

VISION:  No changes. 

HEARING:  No changes. 

PULMONARY:  No shortness of breath. 

CARDIAC:  As outlined above. 

GASTROINTESTINAL:  No nausea, vomiting, or diarrhea.



PHYSICAL EXAMINATION:

GENERAL:  This is a 5-foot 5-inch tall, 199-pound patient, currently with a CPAP. 

VITAL SIGNS:  Blood pressure 119/76, pulse is 90. 

LUNGS:  Clear. 

CARDIAC:  Normal S1.  Normal S2. 

ABDOMEN:  Obese, nontender. 

EXTREMITIES:  No edema.



DIAGNOSTIC STUDIES:  Echocardiogram done in June 2019 showed normal left ventricular

function.  Technically difficult study due to lung disease. 



ASSESSMENT:  

1. Atrial flutter.

2. Chronic obstructive pulmonary disease.



PLAN:  Consult Electrophysiology to see if an ablation may be appropriate.







Job ID:  [id number]

## 2020-09-11 NOTE — PDOC.HOSPP
- Subjective


Encounter Date: 09/11/20


Encounter Time: 08:20


Subjective: 





Patient reports improvement with breathing.  Remains on BiPap.  Denies chest 

pain, abdominal pain, or difficulty urinating.





- Objective


Vital Signs & Weight: 


 Vital Signs (12 hours)











  Temp Pulse Resp Pulse Ox


 


 09/11/20 08:00     90 L


 


 09/11/20 07:36  97.5 F L   


 


 09/11/20 07:23   92  20  93 L


 


 09/11/20 07:22   91  20 


 


 09/11/20 04:00     87 L


 


 09/11/20 03:35  97.2 F L   


 


 09/11/20 00:14   74  16  93 L


 


 09/10/20 23:47  97.0 F L   


 


 09/10/20 23:25   91  26 H  89 L


 


 09/10/20 23:20   102 H  24 H  92 L


 


 09/10/20 23:00     91 L


 


 09/10/20 22:58  97.5 F L   








 Weight











Weight                         199 lb 9.6 oz











 Most Recent Monitor Data











Heart Rate from ECG            83


 


NIBP                           119/76


 


NIBP BP-Mean                   90


 


Respiration from ECG           22


 


SpO2                           93














I&O: 


 











 09/10/20 09/11/20 09/12/20





 06:59 06:59 06:59


 


Intake Total  0 


 


Output Total  900 550


 


Balance  -900 -550











Result Diagrams: 


 09/11/20 03:14





 09/11/20 11:56


Additional Labs: 


 Accuchecks











  09/11/20 09/11/20





  05:00 01:04


 


POC Glucose  121 H  125 H














Hospitalist ROS





- Review of Systems


Constitutional: denies: fever, chills


Respiratory: denies: cough, shortness of breath


Cardiovascular: denies: chest pain, palpitations, edema


Gastrointestinal: denies: nausea, vomiting, abdominal pain


Genitourinary: denies: dysuria


Musculoskeletal: reports: shoulder pain (chronic left shoulder)


Skin: denies: rash


Neurological: denies: weakness





- Medication


Medications: 


Active Medications











Generic Name Dose Route Start Last Admin





  Trade Name Freq  PRN Reason Stop Dose Admin


 


Albuterol/Ipratropium  3 ml  09/11/20 01:00  09/11/20 07:22





  Duoneb  NEB   3 ml





  [id number]-AY RERE   Administration





     





     





     





     


 


Aspirin  81 mg  09/11/20 09:00  09/11/20 08:38





  Ecotrin  PO   Not Given





  DAILY RERE   





     





     





     





     


 


Furosemide  40 mg  09/11/20 09:00  09/11/20 10:00





  Lasix  SLOW IVP   40 mg





  DAILY RERE   Administration





     





     





     





     


 


Methylprednisolone Sodium Succinate  60 mg  09/11/20 02:00  09/11/20 10:00





  Solu-Medrol  IVP   60 mg





  0200,1000,1800 RERE   Administration





     





     





     





     


 


Mometasone Furoate/Formoterol Fumar  2 puff  09/11/20 06:30  09/11/20 07:22





  Dulera 200 Mcg/5 Mcg Inhaler  INH   2 puff





  BID-RT RERE   Administration





     





     





     





     


 


Pantoprazole Sodium  40 mg  09/11/20 09:00  09/11/20 08:39





  Protonix  PO   Not Given





  DAILY RERE   





     





     





     





     














- Exam


General Appearance: awake alert


General - other findings: on Bipap, tolerating well 


Eye: PERRL


ENT: normocephalic atraumatic


Neck: no JVD


Heart: RRR, no murmur


Respiratory: no rales, normal chest expansion


Respiratory - other findings: bibasilar crackles 


Gastrointestinal: soft, non-tender, non-distended


Extremities: no cyanosis, no edema


Neurological: cranial nerve grossly intact


Psychiatric: normal affect, normal behavior, A&O x 3





Hosp A/P


(1) Acute and chronic respiratory failure with hypoxia


Code(s): J96.21 - ACUTE AND CHRONIC RESPIRATORY FAILURE WITH HYPOXIA   Status: 

Acute   





(2) Obesity (BMI 30.0-34.9)


Code(s): E66.9 - OBESITY, UNSPECIFIED   Status: Chronic   





(3) Tobacco abuse


Code(s): Z72.0 - TOBACCO USE   Status: Chronic   





(4) Acute on chronic respiratory failure with hypoxia and hypercapnia


Code(s): J96.21 - ACUTE AND CHRONIC RESPIRATORY FAILURE WITH HYPOXIA; J96.22 - 

ACUTE AND CHRONIC RESPIRATORY FAILURE WITH HYPERCAPNIA   Status: Resolved   





(5) COPD exacerbation


Code(s): J44.1 - CHRONIC OBSTRUCTIVE PULMONARY DISEASE W (ACUTE) EXACERBATION   

Status: Resolved   





- Plan


Plan: 70 year old female PMH diastolic CHF, COPD, asthma who presents to ED for 

chest pain. Found to be in atrial flutter w/RVR, started on cardizem gtt but 

become unstable with HR in 170s and BP low.  DCCV performed in ED and remains 

rate controlled. Uses 4L by NC at home.  Placed on BIPAP due to desaturaing.  

CXR revealed right pleural effusion suspicious for CHF.  Last echo 6/2019- 

preserved EF, mild/moderate mitral and tricuspid regurgitation.  Admitted to 

[facility] for respiratory support





# atrial fibrillation/flutter


# acute and chronic diastolic CHF


# elevated troponin


# acute hypoxic and hypercapneic respiratory failure


# abnormal chest imaging - atalectasis, LLL pneumonia, mediastinal/hilar 

lymphadenopathy on CTA chest


# COPD and asthma w/ suspected exacerbation


# hyponatremia


# hyperkalemia


- cardiology and pulm consulted


- started on IV steroids/levaquin and scheduled/PRN nebs, continue home dulera


- CTA chest concerning for several things, including possible bronchial mass 

and LLL pneumonia, R pleural effusion.


- patient required DCCV in ED, continue lovenox DVT dosing


- requiring BIPAP this morning


- lasix IV, ASA


- follow-up echo


- bladder scan to help rule out obstruction


- trend BMP

## 2020-09-12 LAB
ANION GAP SERPL CALC-SCNC: 12 MMOL/L (ref 10–20)
ANION GAP SERPL CALC-SCNC: 13 MMOL/L (ref 10–20)
BASOPHILS # BLD AUTO: 0 THOU/UL (ref 0–0.2)
BASOPHILS NFR BLD AUTO: 0.9 % (ref 0–1)
BUN SERPL-MCNC: 18 MG/DL (ref 9.8–20.1)
BUN SERPL-MCNC: 20 MG/DL (ref 9.8–20.1)
CALCIUM SERPL-MCNC: 8.4 MG/DL (ref 7.8–10.44)
CALCIUM SERPL-MCNC: 8.5 MG/DL (ref 7.8–10.44)
CHLORIDE SERPL-SCNC: 86 MMOL/L (ref 98–107)
CHLORIDE SERPL-SCNC: 86 MMOL/L (ref 98–107)
CO2 SERPL-SCNC: 33 MMOL/L (ref 23–31)
CO2 SERPL-SCNC: 34 MMOL/L (ref 23–31)
CREAT CL PREDICTED SERPL C-G-VRATE: 104 ML/MIN (ref 70–130)
CREAT CL PREDICTED SERPL C-G-VRATE: 107 ML/MIN (ref 70–130)
EOSINOPHIL # BLD AUTO: 0 THOU/UL (ref 0–0.7)
EOSINOPHIL NFR BLD AUTO: 0.2 % (ref 0–10)
GLUCOSE SERPL-MCNC: 117 MG/DL (ref 80–115)
GLUCOSE SERPL-MCNC: 128 MG/DL (ref 80–115)
HGB BLD-MCNC: 12.6 G/DL (ref 12–16)
LYMPHOCYTES # BLD: 0.8 THOU/UL (ref 1.2–3.4)
LYMPHOCYTES NFR BLD AUTO: 15.6 % (ref 21–51)
MAGNESIUM SERPL-MCNC: 2.2 MG/DL (ref 1.6–2.6)
MCH RBC QN AUTO: 25.1 PG (ref 27–31)
MCV RBC AUTO: 83 FL (ref 78–98)
MONOCYTES # BLD AUTO: 0.3 THOU/UL (ref 0.11–0.59)
MONOCYTES NFR BLD AUTO: 5.7 % (ref 0–10)
NEUTROPHILS # BLD AUTO: 4.1 THOU/UL (ref 1.4–6.5)
NEUTROPHILS NFR BLD AUTO: 77.7 % (ref 42–75)
PLATELET # BLD AUTO: 171 THOU/UL (ref 130–400)
POTASSIUM SERPL-SCNC: 4.8 MMOL/L (ref 3.5–5.1)
POTASSIUM SERPL-SCNC: 5.4 MMOL/L (ref 3.5–5.1)
RBC # BLD AUTO: 5.03 MILL/UL (ref 4.2–5.4)
SODIUM SERPL-SCNC: 127 MMOL/L (ref 136–145)
SODIUM SERPL-SCNC: 127 MMOL/L (ref 136–145)
WBC # BLD AUTO: 5.3 THOU/UL (ref 4.8–10.8)

## 2020-09-12 RX ADMIN — MOMETASONE FUROATE AND FORMOTEROL FUMARATE DIHYDRATE SCH PUFF: 200; 5 AEROSOL RESPIRATORY (INHALATION) at 05:07

## 2020-09-12 RX ADMIN — Medication SCH ML: at 20:28

## 2020-09-12 RX ADMIN — MOMETASONE FUROATE AND FORMOTEROL FUMARATE DIHYDRATE SCH PUFF: 200; 5 AEROSOL RESPIRATORY (INHALATION) at 18:19

## 2020-09-12 RX ADMIN — ASPIRIN SCH MG: 81 TABLET ORAL at 08:09

## 2020-09-12 RX ADMIN — Medication SCH ML: at 08:14

## 2020-09-12 NOTE — PRG
DATE OF SERVICE:  09/12/2020



SUBJECTIVE:  The patient is feeling better.  She refused her BiPAP last night.  She

says she is back at her baseline. 



OBJECTIVE:  VITAL SIGNS:  Temperature 98.4, pulse 91, blood pressure 117/62, O2

saturation 92%. 

HEENT:  Unremarkable. 

NECK:  No JVD. 

LUNGS:  Soft, end-expiratory wheezing. 

CARDIAC:  S1, S2.  Regular. 

ABDOMEN:  Soft. 

EXTREMITIES:  No edema.



LABORATORY DATA:  White blood cell count 5.3, hematocrit 41.8, and platelet count

171.  Sodium 127, potassium 5.4, chloride 86, CO2 of 34, BUN 20, creatinine 0.7, and

glucose 128. 



ASSESSMENT:  

1. Chronic obstructive pulmonary disease with exacerbation.

2. Atrial flutter.

3. Obesity hypoventilation syndrome, plus-minus obstructive sleep apnea.



PLAN:  Ablation expected on Monday.  Needs to remain on telemetry monitoring till

then, but can move out to the telemetry unit.  I have suggested BiPAP at night, but

it does not look the patient will comply.  She will be a continued risk for atrial

arrhythmias, if she does not have her sleep apnea treated. 







Job ID:  [id number]

## 2020-09-12 NOTE — PDOC.HOSPP
- Subjective


Encounter Date: 09/12/20


Encounter Time: 16:00


Subjective: 





Patient seen and examined for respiratory failure.  Mentation improving.  Cough 

with some production.  No chest pain, palpitations or syncope reported.





- Objective


Vital Signs & Weight: 


 Vital Signs (12 hours)











  Temp Pulse Resp Pulse Ox


 


 09/12/20 21:45   93  18  96


 


 09/12/20 20:00     96


 


 09/12/20 19:09  98.8 F   


 


 09/12/20 18:18   86  16  96


 


 09/12/20 15:33  98.6 F   


 


 09/12/20 14:41   85  18  93 L


 


 09/12/20 11:58  99.0 F   








 Weight











Admit Weight                   200 lb 11.2 oz


 


Weight                         197 lb 3.2 oz











 Most Recent Monitor Data











Heart Rate from ECG            92


 


NIBP                           149/80


 


NIBP BP-Mean                   103


 


Respiration from ECG           25


 


SpO2                           97














I&O: 


 











 09/11/20 09/12/20 09/13/20





 06:59 06:59 06:59


 


Intake Total 0 1930 1240


 


Output Total 900 2150 1900


 


Balance -900 -220 -660











Result Diagrams: 


 09/12/20 03:10





 09/12/20 15:02


Additional Labs: 


 Accuchecks











  09/12/20 09/12/20





  06:13 00:09


 


POC Glucose  139 H  118 H











EKG Reviewed by me: Yes (Sinus rhythm on telemetry)





Hospitalist ROS





- Review of Systems


Cardiovascular: denies: chest pain, palpitations, orthopnea, paroxysmal noc. 

dyspnea, edema, light headedness, other


Gastrointestinal: denies: nausea, vomiting, abdominal pain, diarrhea, 

constipation, melena, hematochezia, other





- Medication


Medications: 


Active Medications











Generic Name Dose Route Start Last Admin





  Trade Name Freq  PRN Reason Stop Dose Admin


 


Acetaminophen  650 mg  09/10/20 22:29  09/12/20 08:08





  Tylenol  PO   650 mg





  Q4H PRN   Administration





  Headache/Fever/Mild Pain (1-3)   





     





     





     


 


Albuterol/Ipratropium  3 ml  09/11/20 18:30  09/12/20 21:45





  Duoneb  NEB   3 ml





  [id number] RERE   Administration





     





     





     





     


 


Aspirin  81 mg  09/11/20 09:00  09/12/20 08:09





  Ecotrin  PO   81 mg





  DAILY RERE   Administration





     





     





     





     


 


Enoxaparin Sodium  80 mg  09/12/20 21:00  09/12/20 20:26





  Lovenox  SC  09/13/20 22:00  80 mg





  0900,2100 RERE   Administration





     





     





     





     


 


Furosemide  40 mg  09/11/20 09:00  09/12/20 08:09





  Lasix  SLOW IVP   40 mg





  DAILY RERE   Administration





     





     





     





     


 


Guaifenesin  600 mg  09/12/20 21:00  09/12/20 20:26





  Mucinex  PO   600 mg





  Q12HR RERE   Administration





     





     





     





     


 


Levofloxacin 750 mg/ Device  150 mls @ 100 mls/hr  09/11/20 20:00  09/12/20 20:

26





  IVPB   150 mls





  2000 RERE   Administration





     





     





     





     


 


Melatonin  3 mg  09/12/20 21:00  09/12/20 20:27





  Melatonin  PO   3 mg





  HS RERE   Administration





     





     





     





     


 


Methylprednisolone Sodium Succinate  20 mg  09/12/20 21:00  09/12/20 20:27





  Solu-Medrol  IVP   20 mg





  Q12HR RERE   Administration





     





     





     





     


 


Mometasone Furoate/Formoterol Fumar  2 puff  09/11/20 06:30  09/12/20 18:19





  Dulera 200 Mcg/5 Mcg Inhaler  INH   2 puff





  BID-RT RERE   Administration





     





     





     





     


 


Pantoprazole Sodium  40 mg  09/11/20 09:00  09/12/20 08:09





  Protonix  PO   40 mg





  DAILY RERE   Administration





     





     





     





     


 


Polyethylene Glycol  17 gm  09/12/20 21:00  09/12/20 20:28





  Miralax  PO   Not Given





  HS RERE   





     





     





     





     


 


Pregabalin  150 mg  09/12/20 21:00  09/12/20 20:27





  Lyrica  PO   150 mg





  BID RERE   Administration





     





     





     





     


 


Sodium Chloride  10 ml  09/11/20 21:00  09/12/20 20:28





  Flush - Normal Saline  IVF   10 ml





  Q12HR RERE   Administration





     





     





     





     














- Exam


General Appearance: ill appearing


Neck: supple, no JVD


Heart: RRR, no gallops


Respiratory: rales, rhonchi, wheezes


Gastrointestinal: soft, non-tender, non-distended


Extremities: no cyanosis, no edema


Neurological: no new deficit





Hosp A/P


(1) Acute on chronic respiratory failure with hypoxia and hypercapnia


Code(s): J96.21 - ACUTE AND CHRONIC RESPIRATORY FAILURE WITH HYPOXIA; J96.22 - 

ACUTE AND CHRONIC RESPIRATORY FAILURE WITH HYPERCAPNIA   Status: Acute   





(2) Acute on chronic diastolic heart failure


Code(s): I50.33 - ACUTE ON CHRONIC DIASTOLIC (CONGESTIVE) HEART FAILURE   Status

: Acute   





(3) Atrial flutter with rapid ventricular response


Code(s): I48.92 - UNSPECIFIED ATRIAL FLUTTER   Status: Acute   





(4) COPD exacerbation


Code(s): J44.1 - CHRONIC OBSTRUCTIVE PULMONARY DISEASE W (ACUTE) EXACERBATION   

Status: Acute   





(5) History of cardioversion


Code(s): Z98.890 - OTHER SPECIFIED POSTPROCEDURAL STATES   Status: Acute   





(6) Hyperkalemia


Code(s): E87.5 - HYPERKALEMIA   Status: Acute   





(7) Hyponatremia


Code(s): E87.1 - HYPO-OSMOLALITY AND HYPONATREMIA   Status: Acute   





(8) Toxic metabolic encephalopathy


Code(s): G92 - TOXIC ENCEPHALOPATHY   Status: Acute   





(9) Obesity (BMI 30.0-34.9)


Code(s): E66.9 - OBESITY, UNSPECIFIED   Status: Chronic   





(10) Tobacco abuse


Code(s): Z72.0 - TOBACCO USE   Status: Chronic   





(11) Obesity hypoventilation syndrome


Code(s): E66.2 - MORBID (SEVERE) OBESITY WITH ALVEOLAR HYPOVENTILATION   Status

: Suspected   





(12) Obstructive sleep apnea


Code(s): G47.33 - OBSTRUCTIVE SLEEP APNEA (ADULT) (PEDIATRIC)   Status: 

Suspected   





- Plan





9/12


Continue IV steroids with Levaquin.  Continue nebulizer treatments.  Patient is 

on full dose Lovenox.  Continue IV Lasix.  Recheck labs later today in a.m.  

Resume Lyrica.  Low potassium diet.  A.m. labs.  Continue other medications as 

above

## 2020-09-12 NOTE — PRG
DATE OF SERVICE:  09/12/2020



SUBJECTIVE:  Ms. Brian looks much better today.  She is sitting up just on a nasal

cannula, breathing well.  No complaints. 



OBJECTIVE:  VITAL SIGNS:  Blood pressure 130/80, pulse in the 80s, it is sinus. 

LUNGS:  Clear. 

CARDIAC:  Normal S1, normal S2.



ASSESSMENT:  

1. Atrial flutter, currently in sinus rhythm.

2. Chronic obstructive pulmonary disease.

3. Diastolic heart failure, appears to be improved.



PLAN:  

1. Continue diuretics for now.

2. Hopefully, can undergo ablation on Monday.

3. We will resume enoxaparin this morning, stop this Sunday night.







Job ID:  [id number]

## 2020-09-13 LAB
ANION GAP SERPL CALC-SCNC: 12 MMOL/L (ref 10–20)
ANION GAP SERPL CALC-SCNC: 15 MMOL/L (ref 10–20)
BUN SERPL-MCNC: 12 MG/DL (ref 9.8–20.1)
BUN SERPL-MCNC: 14 MG/DL (ref 9.8–20.1)
CALCIUM SERPL-MCNC: 7.8 MG/DL (ref 7.8–10.44)
CALCIUM SERPL-MCNC: 8.1 MG/DL (ref 7.8–10.44)
CHLORIDE SERPL-SCNC: 84 MMOL/L (ref 98–107)
CHLORIDE SERPL-SCNC: 87 MMOL/L (ref 98–107)
CO2 SERPL-SCNC: 30 MMOL/L (ref 23–31)
CO2 SERPL-SCNC: 32 MMOL/L (ref 23–31)
CREAT CL PREDICTED SERPL C-G-VRATE: 121 ML/MIN (ref 70–130)
CREAT CL PREDICTED SERPL C-G-VRATE: 94 ML/MIN (ref 70–130)
GLUCOSE SERPL-MCNC: 112 MG/DL (ref 80–115)
GLUCOSE SERPL-MCNC: 143 MG/DL (ref 80–115)
HGB BLD-MCNC: 12.7 G/DL (ref 12–16)
MAGNESIUM SERPL-MCNC: 2.1 MG/DL (ref 1.6–2.6)
MCH RBC QN AUTO: 24.7 PG (ref 27–31)
MCV RBC AUTO: 82.9 FL (ref 78–98)
MDIFF COMPLETE?: YES
PLATELET # BLD AUTO: 189 THOU/UL (ref 130–400)
POTASSIUM SERPL-SCNC: 4.9 MMOL/L (ref 3.5–5.1)
POTASSIUM SERPL-SCNC: 5.3 MMOL/L (ref 3.5–5.1)
RBC # BLD AUTO: 5.15 MILL/UL (ref 4.2–5.4)
SODIUM SERPL-SCNC: 123 MMOL/L (ref 136–145)
SODIUM SERPL-SCNC: 127 MMOL/L (ref 136–145)
WBC # BLD AUTO: 5.2 THOU/UL (ref 4.8–10.8)

## 2020-09-13 RX ADMIN — Medication SCH ML: at 08:31

## 2020-09-13 RX ADMIN — MOMETASONE FUROATE AND FORMOTEROL FUMARATE DIHYDRATE SCH PUFF: 200; 5 AEROSOL RESPIRATORY (INHALATION) at 18:23

## 2020-09-13 RX ADMIN — ASPIRIN SCH MG: 81 TABLET ORAL at 08:29

## 2020-09-13 RX ADMIN — Medication SCH ML: at 22:08

## 2020-09-13 RX ADMIN — MOMETASONE FUROATE AND FORMOTEROL FUMARATE DIHYDRATE SCH PUFF: 200; 5 AEROSOL RESPIRATORY (INHALATION) at 06:57

## 2020-09-13 NOTE — PDOC.HOSPP
- Subjective


Encounter Date: 09/13/20


Encounter Time: 09:15


Subjective: 





Patient seen and examined for respiratory failure.  Symptomatically feels much 

better.  Sitting on the side of the bed.  Some productive cough.  Refused BiPAP 

last night.





- Objective


Vital Signs & Weight: 


 Vital Signs (12 hours)











  Temp Pulse Resp Pulse Ox


 


 09/13/20 07:08     98


 


 09/13/20 07:06  98.1 F   


 


 09/13/20 06:55   79  24 H  94 L


 


 09/13/20 03:27  98.2 F   


 


 09/13/20 02:57   88  20  93 L


 


 09/12/20 23:29  97.5 F L   








 Weight











Admit Weight                   200 lb 11.2 oz


 


Weight                         199 lb 14.4 oz











 Most Recent Monitor Data











Heart Rate from ECG            85


 


NIBP                           141/76


 


NIBP BP-Mean                   97


 


Respiration from ECG           23


 


SpO2                           92














I&O: 


 











 09/12/20 09/13/20 09/14/20





 06:59 06:59 06:59


 


Intake Total 1930 1740 


 


Output Total 2150 2250 


 


Balance -220 -510 











Result Diagrams: 


 09/13/20 03:25





 09/13/20 03:25


Additional Labs: 


 Accuchecks











  09/13/20





  05:59


 


POC Glucose  119 H











EKG Reviewed by me: Yes ('s rhythm on telemetry)





Hospitalist ROS





- Review of Systems


Respiratory: reports: cough, SOB with excertion, sputum, wheezing


Cardiovascular: denies: chest pain, palpitations, orthopnea, paroxysmal noc. 

dyspnea, edema, light headedness, other


Gastrointestinal: denies: nausea, vomiting, abdominal pain, diarrhea, 

constipation, melena, hematochezia, other





- Medication


Medications: 


Active Medications











Generic Name Dose Route Start Last Admin





  Trade Name Freq  PRN Reason Stop Dose Admin


 


Acetaminophen  650 mg  09/10/20 22:29  09/12/20 08:08





  Tylenol  PO   650 mg





  Q4H PRN   Administration





  Headache/Fever/Mild Pain (1-3)   





     





     





     


 


Albuterol/Ipratropium  3 ml  09/11/20 18:30  09/13/20 06:55





  Duoneb  NEB   3 ml





  [id number] RERE   Administration





     





     





     





     


 


Aspirin  81 mg  09/11/20 09:00  09/13/20 08:29





  Ecotrin  PO   81 mg





  DAILY RERE   Administration





     





     





     





     


 


Enoxaparin Sodium  80 mg  09/12/20 21:00  09/13/20 08:29





  Lovenox  SC  09/13/20 22:00  80 mg





  0900,2100 RERE   Administration





     





     





     





     


 


Furosemide  40 mg  09/11/20 09:00  09/12/20 08:09





  Lasix  SLOW IVP   40 mg





  DAILY RERE   Administration





     





     





     





     


 


Guaifenesin  600 mg  09/12/20 21:00  09/13/20 08:29





  Mucinex  PO   600 mg





  Q12HR RERE   Administration





     





     





     





     


 


Levofloxacin 750 mg/ Device  150 mls @ 100 mls/hr  09/11/20 20:00  09/12/20 20:

26





  IVPB   150 mls





  2000 RERE   Administration





     





     





     





     


 


Melatonin  3 mg  09/12/20 21:00  09/12/20 20:27





  Melatonin  PO   3 mg





  HS RERE   Administration





     





     





     





     


 


Methylprednisolone Sodium Succinate  20 mg  09/12/20 21:00  09/13/20 08:30





  Solu-Medrol  IVP   20 mg





  Q12HR RERE   Administration





     





     





     





     


 


Mometasone Furoate/Formoterol Fumar  2 puff  09/11/20 06:30  09/13/20 06:57





  Dulera 200 Mcg/5 Mcg Inhaler  INH   2 puff





  BID-RT RERE   Administration





     





     





     





     


 


Pantoprazole Sodium  40 mg  09/11/20 09:00  09/13/20 08:29





  Protonix  PO   40 mg





  DAILY RERE   Administration





     





     





     





     


 


Polyethylene Glycol  17 gm  09/12/20 21:00  09/12/20 20:28





  Miralax  PO   Not Given





  HS RERE   





     





     





     





     


 


Sodium Chloride  10 ml  09/11/20 21:00  09/13/20 08:31





  Flush - Normal Saline  IVF   10 ml





  Q12HR RERE   Administration





     





     





     





     














- Exam


General Appearance: NAD


Neck: supple, no JVD


Heart: RRR, no gallops, no rubs, normal peripheral pulses


Respiratory: rales, rhonchi, tachypneic, wheezes


Gastrointestinal: soft, non-distended, no guarding, no rigidity


Extremities: no cyanosis, no clubbing, no edema


Extremities - other findings: No calf tenderness


Musculoskeletal: generalized weakness


Psychiatric: normal affect, A&O x 3





Hosp A/P


(1) Acute on chronic respiratory failure with hypoxia and hypercapnia


Code(s): J96.21 - ACUTE AND CHRONIC RESPIRATORY FAILURE WITH HYPOXIA; J96.22 - 

ACUTE AND CHRONIC RESPIRATORY FAILURE WITH HYPERCAPNIA   Status: Acute   





(2) Acute on chronic diastolic heart failure


Code(s): I50.33 - ACUTE ON CHRONIC DIASTOLIC (CONGESTIVE) HEART FAILURE   Status

: Acute   





(3) Atrial flutter with rapid ventricular response


Code(s): I48.92 - UNSPECIFIED ATRIAL FLUTTER   Status: Acute   





(4) COPD exacerbation


Code(s): J44.1 - CHRONIC OBSTRUCTIVE PULMONARY DISEASE W (ACUTE) EXACERBATION   

Status: Acute   





(5) History of cardioversion


Code(s): Z98.890 - OTHER SPECIFIED POSTPROCEDURAL STATES   Status: Acute   





(6) Hyperkalemia


Code(s): E87.5 - HYPERKALEMIA   Status: Acute   





(7) Hyponatremia


Code(s): E87.1 - HYPO-OSMOLALITY AND HYPONATREMIA   Status: Acute   





(8) Toxic metabolic encephalopathy


Code(s): G92 - TOXIC ENCEPHALOPATHY   Status: Acute   





(9) Obesity (BMI 30.0-34.9)


Code(s): E66.9 - OBESITY, UNSPECIFIED   Status: Chronic   





(10) Tobacco abuse


Code(s): Z72.0 - TOBACCO USE   Status: Chronic   





(11) Obesity hypoventilation syndrome


Code(s): E66.2 - MORBID (SEVERE) OBESITY WITH ALVEOLAR HYPOVENTILATION   Status

: Suspected   





(12) Obstructive sleep apnea


Code(s): G47.33 - OBSTRUCTIVE SLEEP APNEA (ADULT) (PEDIATRIC)   Status: 

Suspected   





- Plan


DVT proph w/SCDs





9/13


Sodium today is 123.  Will check urine and sodium osmolality as well as urine 

sodium.  Add fluid restriction.  Recheck labs at 3 PM today.  Lasix on hold.  

Continue IV Levaquin with IV Solu-Medrol.  Also on 1 mg/kg Lovenox for atrial 

flutter.  Change Lyrica to 200 mg nightly per patient request.  Recheck labs in 

a.m.  Will consider nephrology consultation if her sodium does not improve.





9/12


Continue IV steroids with Levaquin.  Continue nebulizer treatments.  Patient is 

on full dose Lovenox.  Continue IV Lasix.  Recheck labs later today in a.m.  

Resume Lyrica.  Low potassium diet.  A.m. labs.  Continue other medications as 

above

## 2020-09-13 NOTE — PRG
DATE OF SERVICE:  09/13/2020



SUBJECTIVE:  Ms. Brian is doing reasonably well and wants to go home.



OBJECTIVE:  VITAL SIGNS:  Temperature is 98.1, pulse 99, blood pressure 141/76, O2

saturation 92%. 

HEENT:  Unremarkable. 

NECK:  No JVD. 

LUNGS:  Clear without wheezing or rhonchi. 

CARDIAC:  S1 and S2, regular. 

ABDOMEN:  Soft. 

EXTREMITIES:  No edema.



LABORATORY DATA:  White blood cell count 5.2, hematocrit 42.7, and platelet count

189.  Sodium 123, potassium 5.3, chloride 84, CO2 of 32, BUN 14, creatinine 0.6, and

glucose 112. 



ASSESSMENT:  

1. Atrial flutter.

2. Chronic obstructive pulmonary disease with exacerbation.

3. Worsening hyponatremia.



RECOMMENDATIONS:  

1. Switch her to oral prednisone.

2. I would go ahead and resume her Lasix.

3. We would assume she could go home once she has had her ablation procedure and her

sodium level has improved. 







Job ID:  [id number]

## 2020-09-13 NOTE — PRG
DATE OF SERVICE:  09/10/2020



SUBJECTIVE:  Ms. Brian feels much better.  Her breathing is improved.  No chest

pain or pressure. 



OBJECTIVE:  VITAL SIGNS:  Her blood pressure 132/70 and pulse 90. 

LUNGS:  Clear. 

CARDIAC:  Normal S1 and normal S2.



ASSESSMENT:  

1. Chronic obstructive pulmonary disease, improved.

2. Atrial flutter.



PLAN:  Keep n.p.o. after midnight for possible atrial flutter ablation.







Job ID:  [id number]

## 2020-09-13 NOTE — PDOC.EVN
Event Note





- Event Note


Event Note: 





called for low Na





will hold lasix for today, will recheck lyes later on today

## 2020-09-14 LAB
ANION GAP SERPL CALC-SCNC: 12 MMOL/L (ref 10–20)
BASOPHILS # BLD AUTO: 0.1 THOU/UL (ref 0–0.2)
BASOPHILS NFR BLD AUTO: 0.9 % (ref 0–1)
BUN SERPL-MCNC: 12 MG/DL (ref 9.8–20.1)
CALCIUM SERPL-MCNC: 8 MG/DL (ref 7.8–10.44)
CHLORIDE SERPL-SCNC: 95 MMOL/L (ref 98–107)
CO2 SERPL-SCNC: 32 MMOL/L (ref 23–31)
CREAT CL PREDICTED SERPL C-G-VRATE: 121 ML/MIN (ref 70–130)
EOSINOPHIL # BLD AUTO: 0 THOU/UL (ref 0–0.7)
EOSINOPHIL NFR BLD AUTO: 0.2 % (ref 0–10)
GLUCOSE SERPL-MCNC: 95 MG/DL (ref 80–115)
HGB BLD-MCNC: 13.6 G/DL (ref 12–16)
LYMPHOCYTES # BLD: 2.5 THOU/UL (ref 1.2–3.4)
LYMPHOCYTES NFR BLD AUTO: 31.1 % (ref 21–51)
MCH RBC QN AUTO: 24.6 PG (ref 27–31)
MCV RBC AUTO: 84.8 FL (ref 78–98)
MONOCYTES # BLD AUTO: 0.8 THOU/UL (ref 0.11–0.59)
MONOCYTES NFR BLD AUTO: 9.5 % (ref 0–10)
NEUTROPHILS # BLD AUTO: 4.6 THOU/UL (ref 1.4–6.5)
NEUTROPHILS NFR BLD AUTO: 58.3 % (ref 42–75)
PLATELET # BLD AUTO: 200 THOU/UL (ref 130–400)
POTASSIUM SERPL-SCNC: 4.9 MMOL/L (ref 3.5–5.1)
RBC # BLD AUTO: 5.52 MILL/UL (ref 4.2–5.4)
SODIUM SERPL-SCNC: 134 MMOL/L (ref 136–145)
WBC # BLD AUTO: 7.9 THOU/UL (ref 4.8–10.8)

## 2020-09-14 PROCEDURE — 02K83ZZ MAP CONDUCTION MECHANISM, PERCUTANEOUS APPROACH: ICD-10-PCS | Performed by: INTERNAL MEDICINE

## 2020-09-14 PROCEDURE — 4A023FZ MEASUREMENT OF CARDIAC RHYTHM, PERCUTANEOUS APPROACH: ICD-10-PCS | Performed by: INTERNAL MEDICINE

## 2020-09-14 PROCEDURE — 4A0234Z MEASUREMENT OF CARDIAC ELECTRICAL ACTIVITY, PERCUTANEOUS APPROACH: ICD-10-PCS | Performed by: INTERNAL MEDICINE

## 2020-09-14 PROCEDURE — 02583ZZ DESTRUCTION OF CONDUCTION MECHANISM, PERCUTANEOUS APPROACH: ICD-10-PCS | Performed by: INTERNAL MEDICINE

## 2020-09-14 RX ADMIN — Medication SCH ML: at 21:54

## 2020-09-14 RX ADMIN — ASPIRIN SCH MG: 81 TABLET ORAL at 08:44

## 2020-09-14 RX ADMIN — MOMETASONE FUROATE AND FORMOTEROL FUMARATE DIHYDRATE SCH PUFF: 200; 5 AEROSOL RESPIRATORY (INHALATION) at 07:15

## 2020-09-14 RX ADMIN — MOMETASONE FUROATE AND FORMOTEROL FUMARATE DIHYDRATE SCH: 200; 5 AEROSOL RESPIRATORY (INHALATION) at 20:04

## 2020-09-14 RX ADMIN — Medication SCH ML: at 08:45

## 2020-09-14 NOTE — PDOC.EVN
Event Note





- Event Note


Event Note: 





Informed by nursing that patient converted to a flutter while up to the 

bathroom. Patient has a history of atrial flutter and is scheduled for ablation 

tomorrow.  Heart rate in the 170s blood pressure 141/86.  Discussed patient 

with Dr. Schmitt.  One-time dose of Cardizem given.  Patient became hypotensive 

at 97/49 but was still asymptomatic.  Patient rested in bed and heart rate 

remained 130s.  Blood pressure malena to systolically in the 120s shortly after 

so a one-time dose of Lopressor ordered.

## 2020-09-14 NOTE — PRG
DATE OF SERVICE:  09/14/2020



SUBJECTIVE:  A 70-year-old morbidly obese female, who is to undergo a cardiac

ablation today. 



OBJECTIVE:  VITAL SIGNS:  Temperature 99, pulse 104, respirations 16, saturations

94% on 4 L, blood pressure 90/53. 

GENERAL:  She denies any difficulty breathing. 

CHEST:  Decreased breath sounds.  No wheezing. 

CARDIAC:  Normal S1 and S2.  No gallops. 

ABDOMEN:  No masses.



LABORATORY DATA:  Sodium is 127.  White count 7.9.  X-ray did not show any acute

infiltrates, but CAT scan showed right lower lung atelectatic changes. 



ASSESSMENT:  Morbid obesity, chronic obstructive pulmonary disease, bronchitis,

supraventricular tachycardia, hyponatremia. 



PLAN:  

1. Continue neb treatments, supportive care, antibiotics.

2. Prednisone.  We will follow.







Job ID:  [id number]

## 2020-09-14 NOTE — EKG
Test Reason : STAT

Blood Pressure : ***/*** mmHG

Vent. Rate : 138 BPM     Atrial Rate : 147 BPM

   P-R Int : 000 ms          QRS Dur : 070 ms

    QT Int : 298 ms       P-R-T Axes : 000 065 075 degrees

   QTc Int : 451 ms

 

Atrial fibrillation with rapid ventricular response with premature ventricular or aberrantly conducte
d complexes

Abnormal ECG

When compared with ECG of 10-SEP-2020 18:41, (Unconfirmed)

Atrial fibrillation has replaced Sinus rhythm

Vent. rate has increased BY  54 BPM

Criteria for Septal infarct are no longer Present

Confirmed by RUPA PATEL M.D. ([id number]) on 9/14/2020 1:31:33 PM

 

Referred By: MERARI MCADAMS           Confirmed By:RUPA PATEL M.D.

## 2020-09-14 NOTE — PDOC.HOSPP
- Subjective


Encounter Date: 09/14/20


Encounter Time: 09:00


Subjective: 





Patient seen and examined for respiratory failure with atrial flutter.  

Overnight events noted.  RN reported significant tachycardia with heart rate in 

130s to 150s.  Denies any chest pain.  No significant change in shortness of 

breath.





- Objective


Vital Signs & Weight: 


 Vital Signs (12 hours)











  Temp Pulse Resp BP Pulse Ox


 


 09/14/20 11:50  98.7 F  124 H  18  120/53 L  91 L


 


 09/14/20 10:52   104 H  16  


 


 09/14/20 08:29  99 F  125 H  20  92/53 L  90 L


 


 09/14/20 07:16   127 H  16  


 


 09/14/20 04:00  98.0 F  99  22 H  101/54 L  90 L








 Weight











Admit Weight                   200 lb 11.2 oz


 


Weight                         197 lb 3.2 oz











 Most Recent Monitor Data











Heart Rate from ECG            99


 


NIBP                           130/59


 


NIBP BP-Mean                   82


 


Respiration from ECG           24


 


SpO2                           95














I&O: 


 











 09/13/20 09/14/20 09/15/20





 06:59 06:59 06:59


 


Intake Total 1740 880 


 


Output Total 2250 1960 


 


Balance -510 -1080 











Result Diagrams: 


 09/14/20 04:12





 09/14/20 04:12


Additional Labs: 


 Accuchecks











  09/14/20 09/14/20 09/13/20





  11:45 06:13 22:11


 


POC Glucose  112 H  90  109











EKG Reviewed by me: Yes (Telemetrynarrow complex tachyarrhythmia)





Hospitalist ROS





- Review of Systems


Respiratory: reports: cough, dry, SOB with excertion.  denies: shortness of 

breath, hemoptysis, pleuritic pain, sputum, wheezing, other


Cardiovascular: reports: palpitations.  denies: chest pain, orthopnea, 

paroxysmal noc. dyspnea, edema, light headedness, other





- Medication


Medications: 


Active Medications











Generic Name Dose Route Start Last Admin





  Trade Name Freq  PRN Reason Stop Dose Admin


 


Acetaminophen  650 mg  09/10/20 22:29  09/12/20 08:08





  Tylenol  PO   650 mg





  Q4H PRN   Administration





  Headache/Fever/Mild Pain (1-3)   





     





     





     


 


Albuterol/Ipratropium  3 ml  09/11/20 18:30  09/14/20 10:52





  Duoneb  NEB   3 ml





  [id number] RERE   Administration





     





     





     





     


 


Aspirin  81 mg  09/11/20 09:00  09/14/20 08:44





  Ecotrin  PO   81 mg





  DAILY RERE   Administration





     





     





     





     


 


Guaifenesin  600 mg  09/12/20 21:00  09/14/20 08:44





  Mucinex  PO   600 mg





  Q12HR RERE   Administration





     





     





     





     


 


Diltiazem HCl 125 mg/ Sodium  125 mls @ 5 mls/hr  09/14/20 08:15  09/14/20 08:42





  Chloride  IVPB   125 mls





  INF RERE   Administration





     





     





  Protocol   





  5 MG/HR   


 


Levofloxacin  500 mg  09/14/20 09:00  09/14/20 08:44





  Levaquin  PO   500 mg





  DAILY RERE   Administration





     





     





     





     


 


Melatonin  3 mg  09/12/20 21:00  09/13/20 21:27





  Melatonin  PO   3 mg





  HS RERE   Administration





     





     





     





     


 


Metoprolol Tartrate  5 mg  09/14/20 00:17  09/14/20 01:05





  Lopressor  IVP  09/15/20 00:18  5 mg





  ONE PRN   Administration





  Cardiac Arrythmia   





     





     





     


 


Mometasone Furoate/Formoterol Fumar  2 puff  09/11/20 06:30  09/14/20 07:15





  Dulera 200 Mcg/5 Mcg Inhaler  INH   2 puff





  BID-RT RERE   Administration





     





     





     





     


 


Pantoprazole Sodium  40 mg  09/11/20 09:00  09/14/20 08:44





  Protonix  PO   40 mg





  DAILY RERE   Administration





     





     





     





     


 


Polyethylene Glycol  17 gm  09/12/20 21:00  09/13/20 22:08





  Miralax  PO   17 gm





  HS RERE   Administration





     





     





     





     


 


Prednisone  40 mg  09/14/20 09:00  09/14/20 08:44





  Prednisone  PO   40 mg





  DAILY RERE   Administration





     





     





     





     


 


Pregabalin  200 mg  09/13/20 21:00  09/13/20 21:26





  Lyrica  PO   200 mg





  HS RERE   Administration





     





     





     





     


 


Sodium Chloride  10 ml  09/11/20 21:00  09/14/20 08:45





  Flush - Normal Saline  IVF   10 ml





  Q12HR RERE   Administration





     





     





     





     














- Exam


General Appearance: ill appearing


Neck: supple, no JVD


Heart: no gallops


Heart - other findings: Tachycardic


Respiratory: rales, rhonchi


Gastrointestinal: soft, non-tender, no guarding, no rigidity


Psychiatric: normal affect, A&O x 3





Hosp A/P


(1) Acute on chronic respiratory failure with hypoxia and hypercapnia


Code(s): J96.21 - ACUTE AND CHRONIC RESPIRATORY FAILURE WITH HYPOXIA; J96.22 - 

ACUTE AND CHRONIC RESPIRATORY FAILURE WITH HYPERCAPNIA   Status: Acute   





(2) Acute on chronic diastolic heart failure


Code(s): I50.33 - ACUTE ON CHRONIC DIASTOLIC (CONGESTIVE) HEART FAILURE   Status

: Acute   





(3) Atrial flutter with rapid ventricular response


Code(s): I48.92 - UNSPECIFIED ATRIAL FLUTTER   Status: Acute   





(4) COPD exacerbation


Code(s): J44.1 - CHRONIC OBSTRUCTIVE PULMONARY DISEASE W (ACUTE) EXACERBATION   

Status: Acute   





(5) History of cardioversion


Code(s): Z98.890 - OTHER SPECIFIED POSTPROCEDURAL STATES   Status: Acute   





(6) Hyperkalemia


Code(s): E87.5 - HYPERKALEMIA   Status: Acute   





(7) Toxic metabolic encephalopathy


Code(s): G92 - TOXIC ENCEPHALOPATHY   Status: Acute   





(8) Hyponatremia


Code(s): E87.1 - HYPO-OSMOLALITY AND HYPONATREMIA   Status: Acute   





(9) Obesity (BMI 30.0-34.9)


Code(s): E66.9 - OBESITY, UNSPECIFIED   Status: Chronic   





(10) Tobacco abuse


Code(s): Z72.0 - TOBACCO USE   Status: Chronic   





(11) Obesity hypoventilation syndrome


Code(s): E66.2 - MORBID (SEVERE) OBESITY WITH ALVEOLAR HYPOVENTILATION   Status

: Suspected   





(12) Obstructive sleep apnea


Code(s): G47.33 - OBSTRUCTIVE SLEEP APNEA (ADULT) (PEDIATRIC)   Status: 

Suspected   





(13) Moderate mitral regurgitation


Code(s): I34.0 - NONRHEUMATIC MITRAL (VALVE) INSUFFICIENCY   Status: Chronic   





- Plan


DVT proph w/lovenox





9/14


Sodium improved with fluid restriction and holding diuretics.  Will change IV 

Lasix to p.o.  Start IV Cardizem drip for tachyarrhythmia.  Patient is n.p.o. 

for ablation.  Patient is on Lovenox.  Continue IV antibiotics with steroid and 

nebulizer treatment.  Recheck labs in a.m.  Continue other medications as above.





9/13


Sodium today is 123.  Will check urine and sodium osmolality as well as urine 

sodium.  Add fluid restriction.  Recheck labs at 3 PM today.  Lasix on hold.  

Continue IV Levaquin with IV Solu-Medrol.  Also on 1 mg/kg Lovenox for atrial 

flutter.  Change Lyrica to 200 mg nightly per patient request.  Recheck labs in 

a.m.  Will consider nephrology consultation if her sodium does not improve.





9/12


Continue IV steroids with Levaquin.  Continue nebulizer treatments.  Patient is 

on full dose Lovenox.  Continue IV Lasix.  Recheck labs later today in a.m.  

Resume Lyrica.  Low potassium diet.  A.m. labs.  Continue other medications as 

above

## 2020-09-15 VITALS — DIASTOLIC BLOOD PRESSURE: 59 MMHG | SYSTOLIC BLOOD PRESSURE: 132 MMHG | TEMPERATURE: 97.7 F

## 2020-09-15 LAB
ANION GAP SERPL CALC-SCNC: 12 MMOL/L (ref 10–20)
BASOPHILS # BLD AUTO: 0 THOU/UL (ref 0–0.2)
BASOPHILS NFR BLD AUTO: 0.3 % (ref 0–1)
BUN SERPL-MCNC: 13 MG/DL (ref 9.8–20.1)
CALCIUM SERPL-MCNC: 7.7 MG/DL (ref 7.8–10.44)
CHLORIDE SERPL-SCNC: 94 MMOL/L (ref 98–107)
CO2 SERPL-SCNC: 33 MMOL/L (ref 23–31)
CREAT CL PREDICTED SERPL C-G-VRATE: 113 ML/MIN (ref 70–130)
EOSINOPHIL # BLD AUTO: 0 THOU/UL (ref 0–0.7)
EOSINOPHIL NFR BLD AUTO: 0.4 % (ref 0–10)
GLUCOSE SERPL-MCNC: 101 MG/DL (ref 80–115)
HGB BLD-MCNC: 12.9 G/DL (ref 12–16)
LYMPHOCYTES # BLD: 2.2 THOU/UL (ref 1.2–3.4)
LYMPHOCYTES NFR BLD AUTO: 24.3 % (ref 21–51)
MAGNESIUM SERPL-MCNC: 2.3 MG/DL (ref 1.6–2.6)
MCH RBC QN AUTO: 24.4 PG (ref 27–31)
MCV RBC AUTO: 83.1 FL (ref 78–98)
MONOCYTES # BLD AUTO: 0.7 THOU/UL (ref 0.11–0.59)
MONOCYTES NFR BLD AUTO: 8 % (ref 0–10)
NEUTROPHILS # BLD AUTO: 6 THOU/UL (ref 1.4–6.5)
NEUTROPHILS NFR BLD AUTO: 67.1 % (ref 42–75)
PLATELET # BLD AUTO: 172 THOU/UL (ref 130–400)
POTASSIUM SERPL-SCNC: 4.3 MMOL/L (ref 3.5–5.1)
RBC # BLD AUTO: 5.27 MILL/UL (ref 4.2–5.4)
SODIUM SERPL-SCNC: 135 MMOL/L (ref 136–145)
WBC # BLD AUTO: 9 THOU/UL (ref 4.8–10.8)

## 2020-09-15 RX ADMIN — ASPIRIN SCH MG: 81 TABLET ORAL at 08:20

## 2020-09-15 RX ADMIN — MOMETASONE FUROATE AND FORMOTEROL FUMARATE DIHYDRATE SCH PUFF: 200; 5 AEROSOL RESPIRATORY (INHALATION) at 06:40

## 2020-09-15 RX ADMIN — Medication SCH ML: at 08:21

## 2020-09-15 NOTE — PRG
DATE OF SERVICE:  09/15/2020



SUBJECTIVE:  She is status post EP study yesterday. 



Pulmonary diaz, she is doing well.  No coughing, wheezing, or chest pain.  She wants

to go home. 



She is status post ablation.



OBJECTIVE:  VITAL SIGNS:  Saturations are 91% on 4 L, temperature __________

respiratory rate 16, blood pressure __________ 

CHEST:  No wheezing.  No crackles. 

CARDIAC:  Normal S1, S2.  No gallops. 

ABDOMEN:  No masses.



LABORATORY DATA:  Unremarkable.



ASSESSMENT AND PLAN:  

1. Supraventricular tachycardia, status post ablation.

2. Chronic obstructive pulmonary disease, ongoing tobacco abuse, respiratory

failure. 



Pulmonary wise, she is stable enough to be discharged home.  Taper steroids over the

course of a week.  Follow up with the primary care physician. 







Job ID:  [id number]

## 2020-09-15 NOTE — OP
DATE OF PROCEDURE:  09/14/2020



Electrophysiology study and radiofrequency ablation report



REASON FOR PROCEDURE:  Mrs. Brian is a 70-year-old woman with prior history of

advanced COPD, presenting with COPD exacerbation and also diastolic CHF.  She was

noted to be in sustained atrial flutter.  She was emergently cardioverted in the

emergency room and maintained sinus rhythm, but later atrial flutter recurred which

later converted over to atrial fibrillation.  She is here for radiofrequency

ablation.  Her recurrent atrial fibrillation episode was less than 36 hours. 



PROCEDURE:  The patient received general anesthesia specialist.  The right femoral

venous area was prepped, draped, and anesthetized using subcutaneous lidocaine.

After adequate level of sedation achieved, the right femoral vein was cannulated x2

under ultrasound guidance.  Two 8-Burmese short sheath was introduced through which a

ThermoCool SFST catheter was advanced to the right atrium and a Decapolar catheter

was advanced to the right atrium right ventricle, His bundle, and CS position.

Pacing, mapping and recording was performed at each location.  The following

findings were noted. 

Baseline rhythm was atrial fibrillation with cycle length of 750 milliseconds.  QRS

56 milliseconds,  milliseconds.  The HV measured 343 milliseconds at baseline.

 Following this, cardioversion was performed during sinus rhythm.  The sinus node

recovery time was measured to be 1286 milliseconds.  Corrected sinus node recovery

time was 355 milliseconds.  AV Wenckebach was 330 milliseconds.  Retrograde

Wenckebach cycle was 370 milliseconds.  AV samantha /220 milliseconds.  Due to

the typical isthmus dependent atrial flutter with morphology on the presenting

rhythm, decision was made to proceed with cavotricuspid isthmus ablation.

Cavotricuspid isthmus ablation was performed.  A total of 11 lesions delivered.  We

were able to prolong the transisthmus chase from baseline 30 milliseconds to 130

milliseconds.  Unilateral block was demonstrated by longest transisthmus time

measured by the ablation line.  Following that burst atrial pacing did not reinduce

atrial arrhythmias. 



The baseline cardiac silhouette did not change with the procedure.   



The catheter was removed and the vascular access site was closed with Vascade

closure. 



CONCLUSION:  

1. Successful cavotricuspid isthmus ablation.

2. Normal sinus samantha and AV samantha His-Purkinje function.  No evidence of accessory

pathway or dual AV samantha physiology is present. 

3. No re-inducible arrhythmia in the end of the case.



PLAN:  

1. Monitor for recurrent atrial fibrillation.  

2. Consider resuming oral anticoagulation.  

3. Monitor for small baseline pericardial effusion.







Job ID:  [id number]

## 2020-09-15 NOTE — DIS
DATE OF ADMISSION:  09/10/2020



DATE OF DISCHARGE:  09/15/2020



DISCHARGE DISPOSITION:  Home.



FOLLOWUP:  

1. Follow up with primary care physician, Dr. Kraus, on 21st September 2020.

2. Follow up with Cardiology and Electrophysiology as scheduled.

3. Follow up with Pulmonary as scheduled.



DISCHARGE MEDICATIONS:  

1. Prednisone taper.

2. Levaquin 500 mg daily for next 4 days.

3. Eliquis 5 mg p.o. b.i.d.

4. Protonix 40 mg daily.

5. Cardizem  mg daily.

6. Lasix 20 mg daily.

7. Lyrica 200 mg b.i.d.

8. Zocor 20 mg daily.

9. Dulera 2 puffs b.i.d. 



The patient was seen and examined on the day of discharge.  Denies any new

complaints.  No chest pain, shortness of breath, palpitations, nausea, vomiting

reported. 



BRIEF HOSPITAL COURSE:  The patient is a 70-year-old female with COPD and diastolic

heart failure, currently on home oxygen, presented to the emergency room on 10th September 2020 with shortness of breath along with chest discomfort.  She was found

to have heart rate in 170s with hypoxia.  She was started on Cardizem drip with

eventual cardioversion on admission due to unstable vital signs.  She was monitored

in the intermediate care unit and was started on steroids, nebulizer treatment,

antibiotics, as well as noninvasive positive-pressure ventilation.  Her condition

gradually improved.  She was later transitioned to telemetry floor.  Yesterday, she

underwent radiofrequency ablation for atrial flutter by Dr. Vidal.  She has been

started on Lovenox that will be transitioned to Eliquis.  She understands the risk

associated with anticoagulation.  Dr. Vidal also recommended AV samantha agents, which

will be initiated on the day of discharge.  The patient has been cleared by

consultants for discharge. 



DIAGNOSTIC TESTS AND LAB FINDINGS:  ABGs on admission showed pH of 7.1 with pCO2 of

124.5, pO2 of 59.2, with O2 saturation of 85% with bicarbonate of 41. 



Sodium on admission was 127 with potassium of 5.5, chloride of 87, bicarbonate of

33. 



BNP was 244. 



CT angiogram of the chest was negative for pulmonary embolism.  It showed

right-sided bronchial occlusion with right-sided pleural effusion and

mild-to-moderate mediastinal and hilar lymphadenopathy. 



Echocardiogram on 13th September 2020, showed ejection fraction of 55% to 60% with

moderate-to-severe dilatation of the left atrium, moderate mitral regurgitation. 



FINAL DIAGNOSES:  

1. Acute on chronic hypoxic and hypercapnic respiratory failure, requiring

noninvasive positive-pressure ventilation. 

2. Acute on chronic diastolic heart failure, requiring IV diuretics.

3. Atrial flutter with rapid ventricular response, requiring cardioversion due to

unstable vital signs. 

4. Chronic obstructive pulmonary disease exacerbation.

5. Hyperkalemia.

6. Hyponatremia.

7. Toxic-metabolic encephalopathy on admission.

8. Obesity with a BMI of 33.6.

9. Tobacco dependence.

10. Obesity hypoventilation syndrome.

11. Suspected obstructive sleep apnea.



TIME SPENT:  Time coordinating the discharge of this patient was 34 minutes.





Job ID:  [id number]

## 2020-09-15 NOTE — PDOC.EP
- Subjective


Date: 09/15/20


Time: 08:00


Interval History: 





 she feels well 1 day post ablation.  She has had no heart racing, palpitations,

chest pain, stroke-like symptoms, near syncope or passing out. + chronic cough





- Review of Systems


Constitutional: denies: chills, fever, sweats, weakness


Respiratory: reports: cough, shortness of breath.  denies: dry, hemoptysis


Cardiology: denies: chest pain, edema, heart racing, light headedness, pleuritic

pain


Gastrointestinal: denies: abdominal pain, constipation, diarrhea





- Objective


Allergies/Adverse Reactions: 


                                    Allergies











Allergy/AdvReac Type Severity Reaction Status Date / Time


 


bee venom protein (honey bee) Allergy   Verified 03/20/20 12:34


 


codeine Allergy  Hives Verified 03/20/20 12:34


 


Penicillins Allergy   Verified 03/20/20 12:34


 


tramadol Allergy  Hives Verified 03/20/20 12:34


 


gabapentin AdvReac  DIZZINESS Verified 03/20/20 12:34











Vital Signs & Weight: 


                                   Vital Signs











  Temp Pulse Resp BP Pulse Ox


 


 09/15/20 12:10  97.7 F  88  16  132/59 L  95


 


 09/15/20 10:34   85  18   90 L


 


 09/15/20 07:30  99.3 F  63  16  134/73  91 L


 


 09/15/20 06:39   75  16   89 L








                                        











Admit Weight                   200 lb 11.2 oz


 


Weight                         202 lb 3.2 oz














I/O: 


                                       I/O











 09/14/20 09/15/20 09/16/20





 06:59 06:59 06:59


 


Intake Total 880 890 


 


Output Total 6412 2830 


 


Balance -1080 -1940 














- Quality Measures


CV meds: Eliquis: Yes





- Physical Exam


General: alert & oriented x3, appears well, no apparent distress, speech clear, 

affect appropriate


HEENT: mucus membranes moist, normocephaly


Neck: supple neck, midline trachea, no JVD/HJR, no masses, no bruit, no 

lymphadenopathy, no thromegaly


Cardiology: regular rate and rhythm, no murmur, regular rate, regular rhythm, 

PMI nondisplaced


Lungs: no rhonchi, decreased breath sounds, wheezes


Neurology: cranial nerve 2-12 intact, grossly intact, coordination normal


Abdomen: unremarkable, active bowel sounds, HJR negative


Skin: groin sites stable





- Labs


Result Diagrams: 


                                 09/15/20 04:24





                                 09/15/20 04:24





- EKG Interpretation


EKG Method: Telemetry


EKG shows: Sinus rhythm





- Assessment/Plan


Assessment/Plan: 








1. Newly found atrial flutter with rapid ventricular rates requiring emergent


cardioversion on admission, 09/10/2020. 


       a. transient atrial fibrillation seen 


2. Acute on chronic obstructive pulmonary disease exacerbation requiring BiPAP 

with


altered mental status. 


3. diastolic heart failure.


4. Preserved LVEF by 2D echo from 06/01/2019.  Mild-to-moderate MR, mild

-to-moderate


TR, and elevated pulmonary pressures of approximately 52. 





 maintaining sinus rhythm status post EP study and CTI flutter ablation on 

09/14/2020.  Initiating oral anticoagulation on Eliquis 5mg BID for at least 30 

days post ablation.  She was seen to have some atrial fibrillation but would go 

from atrial flutter into AFib.   Monitor for recurrent atrial fibrillation and c

onsider Multaq if recurrence is seen.   She has a history of bleeding 

complications on warfarin from a bleeding gastric ulcer that was endovascularly 

repaired.  stressed the importance of watching for bleeding complications with 

Eliquis.  okay for discharge by EP.  Follow-up in 6 weeks will be arranged

## 2020-09-16 NOTE — EKG
Test Reason : 

Blood Pressure : ***/*** mmHG

Vent. Rate : 082 BPM     Atrial Rate : 082 BPM

   P-R Int : 144 ms          QRS Dur : 072 ms

    QT Int : 384 ms       P-R-T Axes : 039 059 074 degrees

   QTc Int : 448 ms

 

Sinus rhythm with Premature atrial complexes

Otherwise normal ECG

When compared with ECG of 14-SEP-2020 18:57, (Unconfirmed)

T wave amplitude has increased in Anterior leads

Confirmed by RUPA PATEL M.D. ([id number]) on 9/16/2020 10:25:11 AM

 

Referred By:  HOWIE           Confirmed By:RUPA PTAEL M.D.

## 2020-09-16 NOTE — EKG
Test Reason : POST OP

Blood Pressure : ***/*** mmHG

Vent. Rate : 080 BPM     Atrial Rate : 080 BPM

   P-R Int : 140 ms          QRS Dur : 074 ms

    QT Int : 384 ms       P-R-T Axes : 021 049 055 degrees

   QTc Int : 442 ms

 

Sinus rhythm with Premature supraventricular complexes

Abnormal ECG

When compared with ECG of 13-SEP-2020 22:29,

Sinus rhythm has replaced Atrial fibrillation

Vent. rate has decreased BY  58 BPM

 

Confirmed by RUPA PATEL M.D. ([id number]) on 9/16/2020 10:24:53 AM

 

Referred By:             Confirmed By:RUPA PATEL M.D.